# Patient Record
Sex: MALE | Race: WHITE | NOT HISPANIC OR LATINO | Employment: PART TIME | URBAN - METROPOLITAN AREA
[De-identification: names, ages, dates, MRNs, and addresses within clinical notes are randomized per-mention and may not be internally consistent; named-entity substitution may affect disease eponyms.]

---

## 2017-01-30 ENCOUNTER — TRANSCRIBE ORDERS (OUTPATIENT)
Dept: ADMINISTRATIVE | Age: 47
End: 2017-01-30

## 2017-01-30 ENCOUNTER — APPOINTMENT (OUTPATIENT)
Dept: LAB | Age: 47
End: 2017-01-30
Payer: MEDICARE

## 2017-01-30 DIAGNOSIS — N18.9 CHRONIC KIDNEY DISEASE, UNSPECIFIED: ICD-10-CM

## 2017-01-30 DIAGNOSIS — D64.9 ANEMIA, UNSPECIFIED: Primary | ICD-10-CM

## 2017-01-30 DIAGNOSIS — D64.9 ANEMIA, UNSPECIFIED: ICD-10-CM

## 2017-01-30 DIAGNOSIS — E56.9 UNSPECIFIED VITAMIN DEFICIENCY: ICD-10-CM

## 2017-01-30 DIAGNOSIS — E03.9 UNSPECIFIED HYPOTHYROIDISM: ICD-10-CM

## 2017-01-30 LAB
25(OH)D3 SERPL-MCNC: 36.4 NG/ML (ref 30–100)
ALBUMIN SERPL BCP-MCNC: 4 G/DL (ref 3.5–5)
ALP SERPL-CCNC: 108 U/L (ref 46–116)
ALT SERPL W P-5'-P-CCNC: 39 U/L (ref 12–78)
ANION GAP SERPL CALCULATED.3IONS-SCNC: 7 MMOL/L (ref 4–13)
AST SERPL W P-5'-P-CCNC: 18 U/L (ref 5–45)
BILIRUB SERPL-MCNC: 0.56 MG/DL (ref 0.2–1)
BUN SERPL-MCNC: 11 MG/DL (ref 5–25)
CALCIUM SERPL-MCNC: 9.2 MG/DL (ref 8.3–10.1)
CHLORIDE SERPL-SCNC: 102 MMOL/L (ref 100–108)
CHOLEST SERPL-MCNC: 196 MG/DL (ref 50–200)
CO2 SERPL-SCNC: 29 MMOL/L (ref 21–32)
CREAT SERPL-MCNC: 0.32 MG/DL (ref 0.6–1.3)
ERYTHROCYTE [DISTWIDTH] IN BLOOD BY AUTOMATED COUNT: 12.3 % (ref 11.6–15.1)
EST. AVERAGE GLUCOSE BLD GHB EST-MCNC: 103 MG/DL
GFR SERPL CREATININE-BSD FRML MDRD: >60 ML/MIN/1.73SQ M
GLUCOSE SERPL-MCNC: 93 MG/DL (ref 65–140)
HBA1C MFR BLD: 5.2 % (ref 4.2–6.3)
HCT VFR BLD AUTO: 45.5 % (ref 36.5–49.3)
HDLC SERPL-MCNC: 58 MG/DL (ref 40–60)
HGB BLD-MCNC: 15.2 G/DL (ref 12–17)
LDLC SERPL CALC-MCNC: 117 MG/DL (ref 0–100)
MCH RBC QN AUTO: 31.5 PG (ref 26.8–34.3)
MCHC RBC AUTO-ENTMCNC: 33.4 G/DL (ref 31.4–37.4)
MCV RBC AUTO: 94 FL (ref 82–98)
PLATELET # BLD AUTO: 233 THOUSANDS/UL (ref 149–390)
PMV BLD AUTO: 10.6 FL (ref 8.9–12.7)
POTASSIUM SERPL-SCNC: 4 MMOL/L (ref 3.5–5.3)
PROT SERPL-MCNC: 8.2 G/DL (ref 6.4–8.2)
RBC # BLD AUTO: 4.83 MILLION/UL (ref 3.88–5.62)
SODIUM SERPL-SCNC: 138 MMOL/L (ref 136–145)
T4 SERPL-MCNC: 11.7 UG/DL (ref 4.7–13.3)
TRIGL SERPL-MCNC: 107 MG/DL
TSH SERPL DL<=0.05 MIU/L-ACNC: 3.19 UIU/ML (ref 0.36–3.74)
WBC # BLD AUTO: 3.34 THOUSAND/UL (ref 4.31–10.16)

## 2017-01-30 PROCEDURE — 80053 COMPREHEN METABOLIC PANEL: CPT

## 2017-01-30 PROCEDURE — 85027 COMPLETE CBC AUTOMATED: CPT

## 2017-01-30 PROCEDURE — 82306 VITAMIN D 25 HYDROXY: CPT

## 2017-01-30 PROCEDURE — 36415 COLL VENOUS BLD VENIPUNCTURE: CPT

## 2017-01-30 PROCEDURE — 84436 ASSAY OF TOTAL THYROXINE: CPT

## 2017-01-30 PROCEDURE — 80061 LIPID PANEL: CPT

## 2017-01-30 PROCEDURE — 84443 ASSAY THYROID STIM HORMONE: CPT

## 2017-01-30 PROCEDURE — 83036 HEMOGLOBIN GLYCOSYLATED A1C: CPT

## 2017-11-08 ENCOUNTER — LAB (OUTPATIENT)
Dept: LAB | Age: 47
End: 2017-11-08
Payer: MEDICARE

## 2017-11-08 ENCOUNTER — TRANSCRIBE ORDERS (OUTPATIENT)
Dept: ADMINISTRATIVE | Age: 47
End: 2017-11-08

## 2017-11-08 DIAGNOSIS — I51.9 MYXEDEMA HEART DISEASE: ICD-10-CM

## 2017-11-08 DIAGNOSIS — D64.9 ANEMIA, UNSPECIFIED TYPE: Primary | ICD-10-CM

## 2017-11-08 DIAGNOSIS — R29.898 DEFICIENCIES OF LIMBS: ICD-10-CM

## 2017-11-08 DIAGNOSIS — M81.0 OSTEOPOROSIS, UNSPECIFIED OSTEOPOROSIS TYPE, UNSPECIFIED PATHOLOGICAL FRACTURE PRESENCE: ICD-10-CM

## 2017-11-08 DIAGNOSIS — IMO0002 VITAMIN DISEASE: ICD-10-CM

## 2017-11-08 DIAGNOSIS — I13.10 BENIGN HYPERTENSIVE HEART AND RENAL DISEASE: ICD-10-CM

## 2017-11-08 DIAGNOSIS — E03.9 MYXEDEMA HEART DISEASE: ICD-10-CM

## 2017-11-08 DIAGNOSIS — D64.9 ANEMIA, UNSPECIFIED TYPE: ICD-10-CM

## 2017-11-08 LAB
25(OH)D3 SERPL-MCNC: 36 NG/ML (ref 30–100)
ALBUMIN SERPL BCP-MCNC: 3.7 G/DL (ref 3.5–5)
ALP SERPL-CCNC: 116 U/L (ref 46–116)
ALT SERPL W P-5'-P-CCNC: 40 U/L (ref 12–78)
ANION GAP SERPL CALCULATED.3IONS-SCNC: 4 MMOL/L (ref 4–13)
AST SERPL W P-5'-P-CCNC: 21 U/L (ref 5–45)
BILIRUB SERPL-MCNC: 0.44 MG/DL (ref 0.2–1)
BUN SERPL-MCNC: 13 MG/DL (ref 5–25)
CALCIUM SERPL-MCNC: 9 MG/DL (ref 8.3–10.1)
CHLORIDE SERPL-SCNC: 104 MMOL/L (ref 100–108)
CHOLEST SERPL-MCNC: 181 MG/DL (ref 50–200)
CO2 SERPL-SCNC: 30 MMOL/L (ref 21–32)
CREAT SERPL-MCNC: 0.26 MG/DL (ref 0.6–1.3)
ERYTHROCYTE [DISTWIDTH] IN BLOOD BY AUTOMATED COUNT: 12.3 % (ref 11.6–15.1)
GFR SERPL CREATININE-BSD FRML MDRD: 168 ML/MIN/1.73SQ M
GLUCOSE P FAST SERPL-MCNC: 83 MG/DL (ref 65–99)
HCT VFR BLD AUTO: 43.1 % (ref 36.5–49.3)
HDLC SERPL-MCNC: 57 MG/DL (ref 40–60)
HGB BLD-MCNC: 14.6 G/DL (ref 12–17)
LDLC SERPL CALC-MCNC: 104 MG/DL (ref 0–100)
MCH RBC QN AUTO: 31.7 PG (ref 26.8–34.3)
MCHC RBC AUTO-ENTMCNC: 33.9 G/DL (ref 31.4–37.4)
MCV RBC AUTO: 94 FL (ref 82–98)
PLATELET # BLD AUTO: 277 THOUSANDS/UL (ref 149–390)
PMV BLD AUTO: 10.3 FL (ref 8.9–12.7)
POTASSIUM SERPL-SCNC: 4 MMOL/L (ref 3.5–5.3)
PROT SERPL-MCNC: 7.8 G/DL (ref 6.4–8.2)
RBC # BLD AUTO: 4.6 MILLION/UL (ref 3.88–5.62)
SODIUM SERPL-SCNC: 138 MMOL/L (ref 136–145)
T4 FREE SERPL-MCNC: 1.04 NG/DL (ref 0.76–1.46)
TRIGL SERPL-MCNC: 101 MG/DL
TSH SERPL DL<=0.05 MIU/L-ACNC: 2.04 UIU/ML (ref 0.36–3.74)
WBC # BLD AUTO: 3.72 THOUSAND/UL (ref 4.31–10.16)

## 2017-11-08 PROCEDURE — 80061 LIPID PANEL: CPT

## 2017-11-08 PROCEDURE — 84439 ASSAY OF FREE THYROXINE: CPT

## 2017-11-08 PROCEDURE — 82306 VITAMIN D 25 HYDROXY: CPT

## 2017-11-08 PROCEDURE — 85027 COMPLETE CBC AUTOMATED: CPT

## 2017-11-08 PROCEDURE — 84443 ASSAY THYROID STIM HORMONE: CPT

## 2017-11-08 PROCEDURE — 36415 COLL VENOUS BLD VENIPUNCTURE: CPT

## 2017-11-08 PROCEDURE — 80053 COMPREHEN METABOLIC PANEL: CPT

## 2018-06-22 ENCOUNTER — OFFICE VISIT (OUTPATIENT)
Dept: URGENT CARE | Age: 48
End: 2018-06-22
Payer: MEDICARE

## 2018-06-22 VITALS
RESPIRATION RATE: 18 BRPM | BODY MASS INDEX: 22.19 KG/M2 | HEART RATE: 60 BPM | HEIGHT: 70 IN | OXYGEN SATURATION: 96 % | WEIGHT: 155 LBS | SYSTOLIC BLOOD PRESSURE: 120 MMHG | DIASTOLIC BLOOD PRESSURE: 80 MMHG | TEMPERATURE: 98.9 F

## 2018-06-22 DIAGNOSIS — R21 RASH: Primary | ICD-10-CM

## 2018-06-22 PROCEDURE — G0463 HOSPITAL OUTPT CLINIC VISIT: HCPCS | Performed by: FAMILY MEDICINE

## 2018-06-22 PROCEDURE — 99213 OFFICE O/P EST LOW 20 MIN: CPT | Performed by: FAMILY MEDICINE

## 2018-06-22 RX ORDER — BORON CITRATE 3 MG
11 TABLET ORAL DAILY
COMMUNITY
End: 2021-06-25

## 2018-06-22 RX ORDER — ZINC GLUCONATE 50 MG
1 TABLET ORAL DAILY
COMMUNITY
End: 2021-06-17 | Stop reason: ALTCHOICE

## 2018-06-22 RX ORDER — VITAMIN E 268 MG
400 CAPSULE ORAL DAILY
COMMUNITY
End: 2021-06-17 | Stop reason: ALTCHOICE

## 2018-06-22 RX ORDER — MAG HYDROX/ALUMINUM HYD/SIMETH 400-400-40
1 SUSPENSION, ORAL (FINAL DOSE FORM) ORAL DAILY
COMMUNITY
End: 2021-06-17 | Stop reason: ALTCHOICE

## 2018-06-22 NOTE — PATIENT INSTRUCTIONS
Bacitracin ointment and bandage applied to area  I advised the patient to closely monitor for any signs of infection including increased redness, pain, fever or chills  Benadryl as needed for itching relief  Follow up with PCP in 3-5 days  Proceed to  ER if symptoms worsen

## 2018-06-22 NOTE — PROGRESS NOTES
[facility]        NAME: Kathreen Fothergill is a 49 y o  male  : 1970    MRN: [id number]  DATE: 2018  TIME: 3:25 PM    Assessment and Plan   Rash [R21]  1  Rash           Patient Instructions   Bacitracin ointment and bandage applied to area  I advised the patient to closely monitor for any signs of infection including increased redness, pain, fever or chills  Benadryl as needed for itching relief  Follow up with PCP in 3-5 days  Proceed to  ER if symptoms worsen  Chief Complaint     Chief Complaint   Patient presents with    Abrasion     Has had linear scratches with tiny, fluid-filled pustules on L shin x 1 month  Has not healed - now sl  crusty with occ  itching  No trauma  History of Present Illness   The patient is a 50-year-old male who presents with a rash on his left lower extremity for approximately 1 week  He cannot recall what may have caused the rash  He states that he wears long pants at all times  He does not remember being bit by an insect  He does have a past medical history of muscular dystrophy and is wheelchair-bound  He does have normal sensation to his lower extremities  He denies a PMH of diabetes  He states that he does wear leg braces at times but is not in that area  The rash is itchy  He denies any fever or chills  The rash is not present on any other part of his body  HPI    Review of Systems   Review of Systems   Constitutional: Negative for chills and fever  Respiratory: Negative for cough and shortness of breath  Cardiovascular: Negative for chest pain, palpitations and leg swelling  Skin: Positive for rash  Neurological: Negative for headaches  All other systems reviewed and are negative          Current Medications       Current Outpatient Prescriptions:     Boron 3 MG CAPS, Take 11 capsules by mouth daily, Disp: , Rfl:     Calcium Carbonate-Vit D-Min (CALCIUM 1200 PO), Take 1 tablet by mouth daily, Disp: , Rfl:    Cholecalciferol (VITAMIN D3) 5000 units CAPS, Take 1 capsule by mouth daily, Disp: , Rfl:     Magnesium 400 MG CAPS, Take 1 capsule by mouth daily, Disp: , Rfl:     Nutritional Supplements (CREATINE) 750 MG CAPS, Take 2 capsules by mouth daily, Disp: , Rfl:     vitamin E, tocopherol, 400 units capsule, Take 400 Units by mouth daily, Disp: , Rfl:     Zinc 50 MG TABS, Take 1 tablet by mouth daily, Disp: , Rfl:     Current Allergies     Allergies as of 06/22/2018 - Reviewed 06/22/2018   Allergen Reaction Noted    Anesthesia s-i-40 [propofol] Other (See Comments) 06/22/2018            The following portions of the patient's history were reviewed and updated as appropriate: allergies, current medications, past family history, past medical history, past social history, past surgical history and problem list      Past Medical History:   Diagnosis Date    [facility] )     since 9th grade       History reviewed  No pertinent surgical history  No family history on file  Medications have been verified  Objective   /80 (BP Location: Left arm, Patient Position: Sitting, Cuff Size: Standard)   Pulse 60   Temp 98 9 °F (37 2 °C) (Oral)   Resp 18   Ht 5' 10" (1 778 m)   Wt 70 3 kg (155 lb)   SpO2 96%   BMI 22 24 kg/m²        Physical Exam     Physical Exam   Constitutional: He appears well-developed and well-nourished  No distress  HENT:   Head: Normocephalic and atraumatic  Pulmonary/Chest: No respiratory distress  Skin: Skin is warm and dry  Rash noted  Rash is vesicular and urticarial  He is not diaphoretic  There is erythema  Nursing note and vitals reviewed

## 2018-06-26 ENCOUNTER — APPOINTMENT (OUTPATIENT)
Dept: RADIOLOGY | Age: 48
End: 2018-06-26
Payer: MEDICARE

## 2018-06-26 ENCOUNTER — OFFICE VISIT (OUTPATIENT)
Dept: URGENT CARE | Age: 48
End: 2018-06-26
Payer: MEDICARE

## 2018-06-26 VITALS
RESPIRATION RATE: 18 BRPM | DIASTOLIC BLOOD PRESSURE: 70 MMHG | WEIGHT: 155 LBS | HEIGHT: 70 IN | TEMPERATURE: 103.1 F | BODY MASS INDEX: 22.19 KG/M2 | OXYGEN SATURATION: 96 % | SYSTOLIC BLOOD PRESSURE: 130 MMHG | HEART RATE: 106 BPM

## 2018-06-26 DIAGNOSIS — R50.9 FEVER, UNSPECIFIED FEVER CAUSE: Primary | ICD-10-CM

## 2018-06-26 DIAGNOSIS — R50.9 FEVER, UNSPECIFIED FEVER CAUSE: ICD-10-CM

## 2018-06-26 LAB
SL AMB  POCT GLUCOSE, UA: ABNORMAL
SL AMB LEUKOCYTE ESTERASE,UA: ABNORMAL
SL AMB POCT BILIRUBIN,UA: ABNORMAL
SL AMB POCT BLOOD,UA: ABNORMAL
SL AMB POCT CLARITY,UA: CLEAR
SL AMB POCT COLOR,UA: YELLOW
SL AMB POCT KETONES,UA: ABNORMAL
SL AMB POCT NITRITE,UA: ABNORMAL
SL AMB POCT PH,UA: 6
SL AMB POCT SPECIFIC GRAVITY,UA: 1.02
SL AMB POCT URINE PROTEIN: ABNORMAL
SL AMB POCT UROBILINOGEN: 0.2

## 2018-06-26 PROCEDURE — 81002 URINALYSIS NONAUTO W/O SCOPE: CPT | Performed by: FAMILY MEDICINE

## 2018-06-26 PROCEDURE — 87086 URINE CULTURE/COLONY COUNT: CPT | Performed by: PHYSICIAN ASSISTANT

## 2018-06-26 PROCEDURE — 71046 X-RAY EXAM CHEST 2 VIEWS: CPT

## 2018-06-26 PROCEDURE — G0463 HOSPITAL OUTPT CLINIC VISIT: HCPCS | Performed by: FAMILY MEDICINE

## 2018-06-26 PROCEDURE — 99214 OFFICE O/P EST MOD 30 MIN: CPT | Performed by: FAMILY MEDICINE

## 2018-06-26 RX ORDER — ACETAMINOPHEN 325 MG/1
650 TABLET ORAL ONCE
Status: COMPLETED | OUTPATIENT
Start: 2018-06-26 | End: 2018-06-26

## 2018-06-26 RX ORDER — SULFAMETHOXAZOLE AND TRIMETHOPRIM 800; 160 MG/1; MG/1
1 TABLET ORAL EVERY 12 HOURS SCHEDULED
Qty: 20 TABLET | Refills: 0 | Status: SHIPPED | OUTPATIENT
Start: 2018-06-26 | End: 2018-06-26 | Stop reason: SDUPTHER

## 2018-06-26 RX ORDER — SULFAMETHOXAZOLE AND TRIMETHOPRIM 800; 160 MG/1; MG/1
1 TABLET ORAL EVERY 12 HOURS SCHEDULED
Qty: 20 TABLET | Refills: 0 | Status: SHIPPED | OUTPATIENT
Start: 2018-06-26 | End: 2018-07-06

## 2018-06-26 RX ADMIN — ACETAMINOPHEN 650 MG: 325 TABLET ORAL at 20:25

## 2018-06-26 NOTE — LETTER
June 26, 2018     Patient: Dianne Dominguez   YOB: 1970   Date of Visit: 6/26/2018       To Whom It May Concern: It is my medical opinion that Dianne Dominguez may return to work on 06/29/2018       If you have any questions or concerns, please don't hesitate to call           Sincerely,  Liat Ivan PA-C

## 2018-06-27 NOTE — PATIENT INSTRUCTIONS
Take antibiotic as directed with food and water  While on this medication begin a probiotic such as yogurt  Continue ibuprofen or Tylenol for fever and discomfort  You may continue a decongestant  Follow-up with your family doctor in the next 1-3 days  Call [facility] to establish care with a family physician if needed  Proceed to the ER if symptoms worsen  Proceed to the ER with worsening infection symptoms

## 2018-06-27 NOTE — PROGRESS NOTES
[facility]        NAME: Ria Bowling is a 49 y o  male  : 1970    MRN: [id number]  DATE: 2018  TIME: 8:27 PM    Assessment and Plan   Fever, unspecified fever cause [R50 9]  1  Fever, unspecified fever cause  XR chest pa & lateral    acetaminophen (TYLENOL) tablet 650 mg     Patient Instructions   Take antibiotic as directed with food and water  While on this medication begin a probiotic such as yogurt  Continue ibuprofen or Tylenol for fever and discomfort  You may continue a decongestant  Follow-up with your family doctor in the next 1-3 days  Call [facility] to establish care with a family physician if needed  Proceed to the ER if symptoms worsen  Proceed to the ER with worsening infection symptoms  Chief Complaint     Chief Complaint   Patient presents with    Fever     Fever today at 7:30 pm - 102 8  Started with chills at 5 pm   Started with sl  nasal congestion today - taking Advil Cold Sinus - last dose at 4 pm  Denies cough, sore throat, dysuria  History of Present Illness     58-year-old male with history of Suazo Muscular Dystrophy presenting with complaint of fever x1 day  T-max prior to presentation was 102 8  Associated sx include clear, watery nasal drainage, dry throat, and generalized myalgias  Sx significantly improved with use of Advil Cold and Sinus q4 hrs with last dose 4 hours ago  He is wheel chair bound but denies recent choking events, aspiration, or near aspiration  He denies fever, chills, coughing fits or other sick sx in the past 2-3 weeks  Pt works as a ticket  at [employer] and reportedly hit knee against the metal counter over a week ago  He was seen in this office and instructed to use an antibacterial cream for treatment  He has not noticed signs of worsening infection and notes the abrasion appears to be healing well  Denies associated ear pain, congestion, cough, wheezing, SOB, CP, palpitations, N/V/D   No known insect bites/animal bites or other injuries  No recent travel  No known sick contacts  He does not see a family doctor regularly but does follow with a specialist in [place] whom he see monthly  Denies recent antibiotic use, requirement for steroid use, or other treatments for chronic conditions  He denies any other PMH  Accompanied by his parents  Review of Systems   Review of Systems   Constitutional: Positive for chills and fever  Negative for diaphoresis and fatigue  HENT: Positive for rhinorrhea  Negative for congestion, ear pain, sinus pressure, sneezing and sore throat  Respiratory: Negative for cough, shortness of breath and wheezing  Cardiovascular: Negative for chest pain and palpitations  Gastrointestinal: Negative for abdominal pain, constipation, diarrhea, nausea and vomiting  Genitourinary: Negative for decreased urine volume, difficulty urinating, dysuria, frequency and urgency  Musculoskeletal: Positive for arthralgias and myalgias  Skin: Positive for wound  Negative for rash  Neurological: Negative for dizziness, seizures, weakness, light-headedness, numbness and headaches       Current Medications       Current Outpatient Prescriptions:     Boron 3 MG CAPS, Take 11 capsules by mouth daily, Disp: , Rfl:     Calcium Carbonate-Vit D-Min (CALCIUM 1200 PO), Take 1 tablet by mouth daily, Disp: , Rfl:     Cholecalciferol (VITAMIN D3) 5000 units CAPS, Take 1 capsule by mouth daily, Disp: , Rfl:     Magnesium 400 MG CAPS, Take 1 capsule by mouth daily, Disp: , Rfl:     Nutritional Supplements (CREATINE) 750 MG CAPS, Take 2 capsules by mouth daily, Disp: , Rfl:     vitamin E, tocopherol, 400 units capsule, Take 400 Units by mouth daily, Disp: , Rfl:     Zinc 50 MG TABS, Take 1 tablet by mouth daily, Disp: , Rfl:     Current Facility-Administered Medications:     acetaminophen (TYLENOL) tablet 650 mg, 650 mg, Oral, Once, Susi Menjivar PA-C    Current Allergies     Allergies as of 06/26/2018 - Reviewed 06/26/2018   Allergen Reaction Noted    Anesthesia s-i-40 [propofol] Other (See Comments) 06/22/2018            The following portions of the patient's history were reviewed and updated as appropriate: allergies, current medications, past family history, past medical history, past social history, past surgical history and problem list      Past Medical History:   Diagnosis Date    Suazo's muscular dystrophy ([address] )     since 9th grade       History reviewed  No pertinent surgical history  No family history on file  Medications have been verified  Objective   /70 (BP Location: Left arm, Patient Position: Sitting, Cuff Size: Standard)   Pulse (!) 106   Temp (!) 103 1 °F (39 5 °C) (Oral)   Resp 18   Ht 5' 10" (1 778 m)   Wt 70 3 kg (155 lb)   SpO2 96%   BMI 22 24 kg/m²      CXR: no acute abn  Urine Dip:   Component 6/26/18 2100   LEUKOCYTE ESTERASE,UA neg     NITRITE,UA neg    SL AMB POCT UROBILINOGEN 0 2    SL AMB POCT URINE PROTEIN neg     PH,UA 6 0     BLOOD,UA large     SPECIFIC GRAVITY,UA 1 025     KETONES,UA neg     BILIRUBIN,UA neg    GLUCOSE, UA neg     COLOR,UA yellow     CLARITY,UA clear        Physical Exam     Physical Exam   Constitutional: He is oriented to person, place, and time  He appears well-developed and well-nourished  No distress  HENT:   Head: Normocephalic and atraumatic  Right Ear: Tympanic membrane, external ear and ear canal normal    Left Ear: Tympanic membrane, external ear and ear canal normal    Mouth/Throat: Uvula is midline, oropharynx is clear and moist and mucous membranes are normal  Mucous membranes are not pale and not dry  No oropharyngeal exudate, posterior oropharyngeal edema or posterior oropharyngeal erythema  Injected nasal mucosa with clear nasal drainage present  No mucosal edema  Eyes: Conjunctivae are normal  Right eye exhibits no discharge  Left eye exhibits no discharge  No scleral icterus  Neck: Neck supple   No spinous process tenderness and no muscular tenderness present  No neck rigidity  Cardiovascular: Regular rhythm and normal heart sounds  Tachycardia present  Exam reveals no gallop, no distant heart sounds and no friction rub  No murmur heard  Pulmonary/Chest: Effort normal  No respiratory distress  He has decreased breath sounds in the right upper field and the right middle field  He has no wheezes  He has no rhonchi  He has no rales  He exhibits no tenderness  Abdominal: Soft  Bowel sounds are normal  He exhibits no distension  There is no hepatosplenomegaly  There is no tenderness  There is no rebound and no CVA tenderness  Lymphadenopathy:     He has no cervical adenopathy  Neurological: He is alert and oriented to person, place, and time  No cranial nerve deficit or sensory deficit  Coordination normal  GCS eye subscore is 4  GCS verbal subscore is 5  GCS motor subscore is 6  Skin: Skin is warm and dry  Abrasion (3 linear markings in a verticle pattern of the anterior left proximal shin/distal knee appearing well healed with new skin formation visible  No crusting, draiange, streaking redness, surrounding edema/erythema, or other signs of secondary infection  NTTP ) noted  No rash noted  Psychiatric: He has a normal mood and affect  His speech is normal and behavior is normal  Thought content normal    Nursing note and vitals reviewed

## 2018-06-28 LAB — BACTERIA UR CULT: NORMAL

## 2018-07-03 ENCOUNTER — OFFICE VISIT (OUTPATIENT)
Dept: FAMILY MEDICINE CLINIC | Facility: CLINIC | Age: 48
End: 2018-07-03
Payer: MEDICARE

## 2018-07-03 VITALS
DIASTOLIC BLOOD PRESSURE: 88 MMHG | SYSTOLIC BLOOD PRESSURE: 122 MMHG | OXYGEN SATURATION: 98 % | RESPIRATION RATE: 16 BRPM | HEART RATE: 97 BPM | TEMPERATURE: 97.8 F

## 2018-07-03 DIAGNOSIS — J20.8 ACUTE BRONCHITIS DUE TO OTHER SPECIFIED ORGANISMS: Primary | ICD-10-CM

## 2018-07-03 PROBLEM — G71.01 BECKER'S MUSCULAR DYSTROPHY (HCC): Chronic | Status: ACTIVE | Noted: 2018-07-03

## 2018-07-03 PROCEDURE — 99214 OFFICE O/P EST MOD 30 MIN: CPT | Performed by: INTERNAL MEDICINE

## 2018-07-03 RX ORDER — BENZONATATE 100 MG/1
100 CAPSULE ORAL 3 TIMES DAILY PRN
Qty: 20 CAPSULE | Refills: 2 | Status: SHIPPED | OUTPATIENT
Start: 2018-07-03 | End: 2018-07-13 | Stop reason: SDUPTHER

## 2018-07-03 RX ORDER — FLUTICASONE PROPIONATE 50 MCG
1 SPRAY, SUSPENSION (ML) NASAL DAILY
Qty: 16 G | Refills: 0 | Status: SHIPPED | OUTPATIENT
Start: 2018-07-03 | End: 2021-06-17 | Stop reason: ALTCHOICE

## 2018-07-03 NOTE — PROGRESS NOTES
Assessment/Plan:Mehdi has some acute bronchitis  Recent chest x-ray was negative urinalysis and urine culture are unremarkable  His cough is nonproductive occurring and spasms and keeping him awake at night  He has had a good response Robitussin the past which she can take as needed in addition will take Tessalon Perles every 8 hours 100-200 mg he is on trimethoprim sulfa the past 4 or 5 days and will finish the prescription he no longer has fever and pneumonia is considered unlikely I just a prolonged cough from his acute bronchitis    Diet reviewed  Lifestyle modifications reviewed  Medications reviewed and ordered  Laboratory tests and studies reviewed and ordered  All patient's questions answered to patient satisfaction  Problem List Items Addressed This Visit     None      Visit Diagnoses     Acute bronchitis due to other specified organisms    -  Primary    Relevant Medications    benzonatate (TESSALON PERLES) 100 mg capsule    fluticasone (FLONASE) 50 mcg/act nasal spray            Subjective:      Patient ID: Keon Barriga is a 49 y o  male  This 50-year-old male with a history of Suazo's muscular dystrophy noted 1 week ago temperatures of 103 to 103-1/2 followed by onset of nasal congestion and a dry nonproductive cough the fever has subsided and he was able to stop taking the ibuprofen 24 hr ago  He was seen several days ago and an urgent care I had a negative chest x-ray and urinalysis and was placed on trimethoprim sulfa DS twice daily  Unfortunately the cough persists and is waking up at night it is nonproductive  The following portions of the patient's history were reviewed and updated as appropriate: allergies, current medications, past family history, past medical history, past social history, past surgical history and problem list     Review of Systems   Constitutional: Negative for appetite change, fatigue, fever and unexpected weight change     HENT: Negative for rhinorrhea, sinus pain, sinus pressure, sneezing and sore throat  Eyes: Negative for visual disturbance  Respiratory: Positive for cough  Negative for chest tightness, shortness of breath and wheezing  Cardiovascular: Negative for chest pain, palpitations and leg swelling  Gastrointestinal: Negative for abdominal distention, abdominal pain, blood in stool, constipation, diarrhea, nausea and vomiting  Endocrine: Negative for polydipsia and polyuria  Genitourinary: Negative for decreased urine volume, difficulty urinating, dysuria, hematuria and urgency  Musculoskeletal: Negative for arthralgias, back pain, joint swelling and neck pain  Skin: Negative for rash  Allergic/Immunologic: Negative for environmental allergies  Neurological: Positive for weakness  Negative for tremors, light-headedness, numbness and headaches  Hematological: Does not bruise/bleed easily  Psychiatric/Behavioral: Negative for agitation, behavioral problems, confusion and dysphoric mood  The patient is not nervous/anxious  Objective:      /88 (BP Location: Right arm, Patient Position: Sitting, Cuff Size: Adult)   Pulse 97   Temp 97 8 °F (36 6 °C) (Temporal)   Resp 16   SpO2 98%          Physical Exam   Constitutional: He is oriented to person, place, and time  He appears well-developed and well-nourished  No distress  HENT:   Head: Normocephalic and atraumatic  Nose: Nose normal    Mouth/Throat: Oropharynx is clear and moist  No oropharyngeal exudate  Eyes: Conjunctivae and EOM are normal  Pupils are equal, round, and reactive to light  No scleral icterus  Neck: Normal range of motion  Neck supple  No JVD present  No tracheal deviation present  No thyromegaly present  Cardiovascular: Normal rate, regular rhythm and normal heart sounds  Exam reveals no gallop and no friction rub  No murmur heard  Pulmonary/Chest: Effort normal  No respiratory distress  He has no wheezes  He has no rales  He exhibits no tenderness  Abdominal: Soft  Bowel sounds are normal  He exhibits no distension and no mass  There is no tenderness  There is no rebound and no guarding  Musculoskeletal: Normal range of motion  He exhibits no edema or deformity  Lymphadenopathy:     He has no cervical adenopathy  Neurological: He is alert and oriented to person, place, and time  No cranial nerve deficit  Coordination normal    Skin: Skin is warm and dry  No rash noted  Psychiatric: He has a normal mood and affect   His behavior is normal  Judgment and thought content normal

## 2018-07-10 ENCOUNTER — TELEPHONE (OUTPATIENT)
Dept: FAMILY MEDICINE CLINIC | Facility: CLINIC | Age: 48
End: 2018-07-10

## 2018-07-13 ENCOUNTER — OFFICE VISIT (OUTPATIENT)
Dept: FAMILY MEDICINE CLINIC | Facility: CLINIC | Age: 48
End: 2018-07-13
Payer: MEDICARE

## 2018-07-13 DIAGNOSIS — G71.01 BECKER'S MUSCULAR DYSTROPHY (HCC): Primary | Chronic | ICD-10-CM

## 2018-07-13 DIAGNOSIS — J20.8 ACUTE BRONCHITIS DUE TO OTHER SPECIFIED ORGANISMS: ICD-10-CM

## 2018-07-13 PROCEDURE — 99214 OFFICE O/P EST MOD 30 MIN: CPT | Performed by: INTERNAL MEDICINE

## 2018-07-13 RX ORDER — BENZONATATE 100 MG/1
CAPSULE ORAL
Qty: 30 CAPSULE | Refills: 1 | Status: SHIPPED | OUTPATIENT
Start: 2018-07-13 | End: 2020-01-21

## 2018-07-13 NOTE — PROGRESS NOTES
Assessment/Plan:    Patient is recovering from acute site rhinitis and acute bronchitis  I will renew his [employer] and he will continue with that and histamine either generic Claritin or Allegra  He  Feels that he will benefit from an air filter in his bedroom and we discussed possibly be HIDA filters and the different air filters that are available pricing and so forth I think he should definitely   Do that  Diet reviewed  Lifestyle modifications reviewed  Medications reviewed and ordered  Laboratory tests and studies reviewed and ordered  All patient's questions answered to patient satisfaction  Diagnoses and all orders for this visit:    Suazo's muscular dystrophy ([address] )    Acute bronchitis due to other specified organisms  -     benzonatate (TESSALON PERLES) 100 mg capsule; 1 q 8 hrs prn cough        Subjective:      Patient ID: Sohail Hall is a 49 y o  male  HPI  This 41-year-old male is recovering from an upper respiratory infection with nasal congestion and acute bronchitis with a very bothersome hacking cough dry cough with no production cough has subsided some but is still bothersome to the patient  He is also bothered by some nasal congestion  He thinks that and histamine use an air filter help his nasal congestion and the Tessalon Perles seem to help suppress his cough  He is not short of breath or femoral   He was able to go typing on Wednesday to 2 days ago without any difficulty      Current Outpatient Prescriptions:     benzonatate (TESSALON PERLES) 100 mg capsule, 1 q 8 hrs prn cough, Disp: 30 capsule, Rfl: 1    [place] 3 MG CAPS, Take 11 capsules by mouth daily, Disp: , Rfl:     Calcium Carbonate-Vit D-Min (CALCIUM 1200 PO), Take 1 tablet by mouth daily, Disp: , Rfl:     Cholecalciferol (VITAMIN D3) 5000 units CAPS, Take 1 capsule by mouth daily, Disp: , Rfl:     fluticasone (FLONASE) 50 mcg/act nasal spray, 1 spray into each nostril daily, Disp: 16 g, Rfl: 0   Magnesium 400 MG CAPS, Take 1 capsule by mouth daily, Disp: , Rfl:     Nutritional Supplements (CREATINE) 750 MG CAPS, Take 2 capsules by mouth daily, Disp: , Rfl:     vitamin E, tocopherol, 400 units capsule, Take 400 Units by mouth daily, Disp: , Rfl:     Zinc 50 MG TABS, Take 1 tablet by mouth daily, Disp: , Rfl:     The following portions of the patient's history were reviewed and updated as appropriate: allergies, current medications, past family history, past medical history, past social history, past surgical history and problem list     Review of Systems   Constitutional: Negative for appetite change, fatigue, fever and unexpected weight change  HENT: Positive for rhinorrhea  Negative for sinus pain, sinus pressure, sneezing and sore throat  Eyes: Negative for visual disturbance  Respiratory: Positive for cough  Negative for chest tightness, shortness of breath and wheezing  Cardiovascular: Negative for chest pain, palpitations and leg swelling  Gastrointestinal: Negative for abdominal distention, abdominal pain, blood in stool, constipation, diarrhea, nausea and vomiting  Endocrine: Negative for polydipsia and polyuria  Genitourinary: Negative for decreased urine volume, difficulty urinating, dysuria, hematuria and urgency  Musculoskeletal: Negative for arthralgias, back pain, joint swelling and neck pain  Skin: Negative for rash  Allergic/Immunologic: Negative for environmental allergies  Neurological: Negative for tremors, weakness, light-headedness, numbness and headaches  Hematological: Does not bruise/bleed easily  Psychiatric/Behavioral: Negative for agitation, behavioral problems, confusion and dysphoric mood  The patient is not nervous/anxious            Family History   Problem Relation Age of Onset    Family history unknown: Yes       Past Medical History:   Diagnosis Date    [facility]     since 9th grade       No past surgical history on file     Social History     Social History    Marital status: Single     Spouse name: N/A    Number of children: N/A    Years of education: N/A     Social History Main Topics    Smoking status: Never Smoker    Smokeless tobacco: Never Used    Alcohol use 0 6 oz/week     1 Glasses of wine per week    Drug use: No    Sexual activity: Not on file     Other Topics Concern    Not on file     Social History Narrative    No narrative on file       Allergies   Allergen Reactions    Anesthesia S-I-40 [Propofol] Other (See Comments)     Malignant hypothermia         Objective: There were no vitals taken for this visit  Physical Exam   Constitutional: He is oriented to person, place, and time  He appears well-developed and well-nourished  No distress  HENT:   Head: Normocephalic and atraumatic  Nose: Mucosal edema and rhinorrhea present  Mouth/Throat: Oropharynx is clear and moist  No oropharyngeal exudate  Eyes: Conjunctivae and EOM are normal  Pupils are equal, round, and reactive to light  No scleral icterus  Neck: Normal range of motion  Neck supple  No JVD present  No tracheal deviation present  No thyromegaly present  Cardiovascular: Normal rate, regular rhythm and normal heart sounds  Exam reveals no gallop and no friction rub  No murmur heard  Pulmonary/Chest: Effort normal  No respiratory distress  He has no wheezes  He has no rales  He exhibits no tenderness  Abdominal: Soft  Bowel sounds are normal  He exhibits no distension and no mass  There is no tenderness  There is no rebound and no guarding  Musculoskeletal: Normal range of motion  He exhibits no edema or deformity  Lymphadenopathy:     He has no cervical adenopathy  Neurological: He is alert and oriented to person, place, and time  No cranial nerve deficit  Coordination normal    Skin: Skin is warm and dry  No rash noted  Psychiatric: He has a normal mood and affect   His behavior is normal  Judgment and thought content normal

## 2018-09-04 ENCOUNTER — APPOINTMENT (OUTPATIENT)
Dept: LAB | Age: 48
End: 2018-09-04
Payer: MEDICARE

## 2018-09-04 ENCOUNTER — TRANSCRIBE ORDERS (OUTPATIENT)
Dept: ADMINISTRATIVE | Age: 48
End: 2018-09-04

## 2018-09-04 DIAGNOSIS — E03.9 HYPOTHYROIDISM, UNSPECIFIED TYPE: ICD-10-CM

## 2018-09-04 DIAGNOSIS — E03.9 HYPOTHYROIDISM, UNSPECIFIED TYPE: Primary | ICD-10-CM

## 2018-09-04 LAB
T4 FREE SERPL-MCNC: 1.06 NG/DL (ref 0.76–1.46)
TSH SERPL DL<=0.05 MIU/L-ACNC: 3.25 UIU/ML (ref 0.36–3.74)

## 2018-09-04 PROCEDURE — 84439 ASSAY OF FREE THYROXINE: CPT

## 2018-09-04 PROCEDURE — 84443 ASSAY THYROID STIM HORMONE: CPT

## 2018-09-04 PROCEDURE — 36415 COLL VENOUS BLD VENIPUNCTURE: CPT

## 2019-08-07 ENCOUNTER — APPOINTMENT (OUTPATIENT)
Dept: LAB | Age: 49
End: 2019-08-07
Payer: MEDICARE

## 2019-08-07 ENCOUNTER — OFFICE VISIT (OUTPATIENT)
Dept: LAB | Age: 49
End: 2019-08-07
Payer: MEDICARE

## 2019-08-07 ENCOUNTER — TRANSCRIBE ORDERS (OUTPATIENT)
Dept: ADMINISTRATIVE | Age: 49
End: 2019-08-07

## 2019-08-07 DIAGNOSIS — I51.9 MYXEDEMA HEART DISEASE: ICD-10-CM

## 2019-08-07 DIAGNOSIS — R53.1 ASTHENIA: ICD-10-CM

## 2019-08-07 DIAGNOSIS — I42.9 FAMILIAL CARDIOMYOPATHY (HCC): ICD-10-CM

## 2019-08-07 DIAGNOSIS — E03.9 MYXEDEMA HEART DISEASE: ICD-10-CM

## 2019-08-07 DIAGNOSIS — D64.9 ANEMIA, UNSPECIFIED TYPE: ICD-10-CM

## 2019-08-07 DIAGNOSIS — N18.9 CHRONIC KIDNEY DISEASE, UNSPECIFIED CKD STAGE: ICD-10-CM

## 2019-08-07 DIAGNOSIS — D64.9 ANEMIA, UNSPECIFIED TYPE: Primary | ICD-10-CM

## 2019-08-07 LAB
25(OH)D3 SERPL-MCNC: 31.6 NG/ML (ref 30–100)
ALBUMIN SERPL BCP-MCNC: 4.4 G/DL (ref 3.5–5)
ALP SERPL-CCNC: 108 U/L (ref 46–116)
ALT SERPL W P-5'-P-CCNC: 45 U/L (ref 12–78)
ANION GAP SERPL CALCULATED.3IONS-SCNC: 7 MMOL/L (ref 4–13)
AST SERPL W P-5'-P-CCNC: 30 U/L (ref 5–45)
ATRIAL RATE: 54 BPM
BILIRUB SERPL-MCNC: 0.54 MG/DL (ref 0.2–1)
BUN SERPL-MCNC: 12 MG/DL (ref 5–25)
CALCIUM SERPL-MCNC: 9.4 MG/DL (ref 8.3–10.1)
CHLORIDE SERPL-SCNC: 109 MMOL/L (ref 100–108)
CK MB SERPL-MCNC: 11.3 NG/ML (ref 0–5)
CK MB SERPL-MCNC: 2 % (ref 0–2.5)
CK SERPL-CCNC: 564 U/L (ref 39–308)
CO2 SERPL-SCNC: 27 MMOL/L (ref 21–32)
CREAT SERPL-MCNC: 0.35 MG/DL (ref 0.6–1.3)
ERYTHROCYTE [DISTWIDTH] IN BLOOD BY AUTOMATED COUNT: 12.2 % (ref 11.6–15.1)
GFR SERPL CREATININE-BSD FRML MDRD: 147 ML/MIN/1.73SQ M
GLUCOSE P FAST SERPL-MCNC: 84 MG/DL (ref 65–99)
HCT VFR BLD AUTO: 44 % (ref 36.5–49.3)
HGB BLD-MCNC: 14.3 G/DL (ref 12–17)
MCH RBC QN AUTO: 30.8 PG (ref 26.8–34.3)
MCHC RBC AUTO-ENTMCNC: 32.5 G/DL (ref 31.4–37.4)
MCV RBC AUTO: 95 FL (ref 82–98)
P AXIS: 58 DEGREES
PLATELET # BLD AUTO: 228 THOUSANDS/UL (ref 149–390)
PMV BLD AUTO: 10.6 FL (ref 8.9–12.7)
POTASSIUM SERPL-SCNC: 3.9 MMOL/L (ref 3.5–5.3)
PR INTERVAL: 112 MS
PROT SERPL-MCNC: 7.6 G/DL (ref 6.4–8.2)
QRS AXIS: 17 DEGREES
QRSD INTERVAL: 86 MS
QT INTERVAL: 458 MS
QTC INTERVAL: 434 MS
RBC # BLD AUTO: 4.65 MILLION/UL (ref 3.88–5.62)
SODIUM SERPL-SCNC: 143 MMOL/L (ref 136–145)
T WAVE AXIS: 54 DEGREES
T4 FREE SERPL-MCNC: 1.09 NG/DL (ref 0.76–1.46)
TSH SERPL DL<=0.05 MIU/L-ACNC: 2.72 UIU/ML (ref 0.36–3.74)
VENTRICULAR RATE: 54 BPM
WBC # BLD AUTO: 3.26 THOUSAND/UL (ref 4.31–10.16)

## 2019-08-07 PROCEDURE — 84443 ASSAY THYROID STIM HORMONE: CPT

## 2019-08-07 PROCEDURE — 82550 ASSAY OF CK (CPK): CPT

## 2019-08-07 PROCEDURE — 36415 COLL VENOUS BLD VENIPUNCTURE: CPT

## 2019-08-07 PROCEDURE — 80053 COMPREHEN METABOLIC PANEL: CPT

## 2019-08-07 PROCEDURE — 82306 VITAMIN D 25 HYDROXY: CPT

## 2019-08-07 PROCEDURE — 93005 ELECTROCARDIOGRAM TRACING: CPT

## 2019-08-07 PROCEDURE — 85027 COMPLETE CBC AUTOMATED: CPT

## 2019-08-07 PROCEDURE — 93010 ELECTROCARDIOGRAM REPORT: CPT | Performed by: INTERNAL MEDICINE

## 2019-08-07 PROCEDURE — 84439 ASSAY OF FREE THYROXINE: CPT

## 2019-08-07 PROCEDURE — 82553 CREATINE MB FRACTION: CPT

## 2020-01-21 ENCOUNTER — OFFICE VISIT (OUTPATIENT)
Dept: FAMILY MEDICINE CLINIC | Facility: CLINIC | Age: 50
End: 2020-01-21
Payer: MEDICARE

## 2020-01-21 VITALS
DIASTOLIC BLOOD PRESSURE: 84 MMHG | HEART RATE: 77 BPM | RESPIRATION RATE: 16 BRPM | SYSTOLIC BLOOD PRESSURE: 130 MMHG | TEMPERATURE: 97.9 F | OXYGEN SATURATION: 98 %

## 2020-01-21 DIAGNOSIS — J31.0 RHINOSINUSITIS: ICD-10-CM

## 2020-01-21 DIAGNOSIS — J32.9 RHINOSINUSITIS: ICD-10-CM

## 2020-01-21 DIAGNOSIS — Z00.00 MEDICARE ANNUAL WELLNESS VISIT, SUBSEQUENT: Primary | ICD-10-CM

## 2020-01-21 DIAGNOSIS — G71.01 BECKER'S MUSCULAR DYSTROPHY (HCC): ICD-10-CM

## 2020-01-21 DIAGNOSIS — Z99.3 WHEELCHAIR BOUND: ICD-10-CM

## 2020-01-21 PROCEDURE — G0439 PPPS, SUBSEQ VISIT: HCPCS | Performed by: INTERNAL MEDICINE

## 2020-01-21 PROCEDURE — 99214 OFFICE O/P EST MOD 30 MIN: CPT | Performed by: INTERNAL MEDICINE

## 2020-01-21 NOTE — PROGRESS NOTES
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)      Other Visit Diagnoses     Medicare annual wellness visit, subsequent    -  Primary           Preventive health issues were discussed with patient, and age appropriate screening tests were ordered as noted in patient's After Visit Summary  Personalized health advice and appropriate referrals for health education or preventive services given if needed, as noted in patient's After Visit Summary  History of Present Illness:     Patient presents for Welcome to Medicare visit  Patient Care Team:  Tia Pham MD as PCP - General (Internal Medicine)     Review of Systems:     Review of Systems   Problem List:     Patient Active Problem List   Diagnosis    Suazo's muscular dystrophy [facility])      Past Medical and Surgical History:     Past Medical History:   Diagnosis Date    Suazo's muscular dystrophy ([facility] )     since 9th grade     No past surgical history on file  Family History:     Family History   Family history unknown: Yes      Social History:     Social History     Socioeconomic History    Marital status: Single     Spouse name: Not on file    Number of children: Not on file    Years of education: Not on file    Highest education level: Not on file   Occupational History    Not on file   Social Needs    Financial resource strain: Not on file    Food insecurity:     Worry: Not on file     Inability: Not on file    Transportation needs:     Medical: Not on file     Non-medical: Not on file   Tobacco Use    Smoking status: Never Smoker    Smokeless tobacco: Never Used   Substance and Sexual Activity    Alcohol use:  Yes     Alcohol/week: 1 0 standard drinks     Types: 1 Glasses of wine per week    Drug use: No    Sexual activity: Not on file   Lifestyle    Physical activity:     Days per week: Not on file     Minutes per session: Not on file    Stress: Not on file   Relationships    Social connections:     Talks on phone: Not on file     Gets together: Not on file     Attends Anabaptism service: Not on file     Active member of club or organization: Not on file     Attends meetings of clubs or organizations: Not on file     Relationship status: Not on file    Intimate partner violence:     Fear of current or ex partner: Not on file     Emotionally abused: Not on file     Physically abused: Not on file     Forced sexual activity: Not on file   Other Topics Concern    Not on file   Social History Narrative    Not on file      Medications and Allergies:     Current Outpatient Medications   Medication Sig Dispense Refill    Highland 3 MG CAPS Take 11 capsules by mouth daily      Calcium Carbonate-Vit D-Min (CALCIUM 1200 PO) Take 1 tablet by mouth daily      Cholecalciferol (VITAMIN D3) 5000 units CAPS Take 1 capsule by mouth daily      fluticasone (FLONASE) 50 mcg/act nasal spray 1 spray into each nostril daily 16 g 0    Magnesium 400 MG CAPS Take 1 capsule by mouth daily      Nutritional Supplements (CREATINE) 750 MG CAPS Take 2 capsules by mouth daily      vitamin E, tocopherol, 400 units capsule Take 400 Units by mouth daily      Zinc 50 MG TABS Take 1 tablet by mouth daily       No current facility-administered medications for this visit        Allergies   Allergen Reactions    Anesthesia S-I-40 [Propofol] Other (See Comments)     Malignant hypothermia      Immunizations:     Immunization History   Administered Date(s) Administered    Hep A / Hep B 12/19/2005, 01/20/2006, 07/24/2006    INFLUENZA 12/04/2013, 12/14/2014, 12/08/2015    Tdap 07/24/2006      Health Maintenance:         Topic Date Due    CRC Screening: Colonoscopy  1970         Topic Date Due    Pneumococcal Vaccine: Pediatrics (0 to 5 Years) and At-Risk Patients (6 to 59 Years) (1 of 1 - PPSV23) 08/13/1975    DTaP,Tdap,and Td Vaccines (2 - Td) 07/24/2016    Influenza Vaccine 07/01/2019      Medicare Screening Tests and Risk Assessments:     Winifred Watkins is here for his Subsequent Wellness visit  Last Medicare Wellness visit information reviewed, patient interviewed, no change since last AWV  Health Risk Assessment:   Patient rates overall health as good  Patient feels that their physical health rating is same  Eyesight was rated as same  Hearing was rated as same  Patient feels that their emotional and mental health rating is same  Pain experienced in the last 7 days has been none  Patient states that he has experienced no weight loss or gain in last 6 months  Depression Screening:   PHQ-2 Score: 0      Fall Risk Screening: In the past year, patient has experienced: no history of falling in past year      Home Safety:  Patient has trouble with stairs inside or outside of their home  Patient has working smoke alarms and has working carbon monoxide detector  Home safety hazards include: none  Nutrition:   Current diet is Regular  Medications:   Patient is currently taking over-the-counter supplements  OTC medications include: see medication list  Patient is able to manage medications  Activities of Daily Living (ADLs)/Instrumental Activities of Daily Living (IADLs):   Walk and transfer into and out of bed and chair?: Yes  Dress and groom yourself?: Yes    Bathe or shower yourself?: No    Feed yourself?  Yes  Do your laundry/housekeeping?: No  Manage your money, pay your bills and track your expenses?: Yes  Make your own meals?: No    Do your own shopping?: No    ADL comments: Pt has FT caregiver to help him    Previous Hospitalizations:   Any hospitalizations or ED visits within the last 12 months?: No      Advance Care Planning:   Living will: No    Durable POA for healthcare: No    Advanced directive: No    End of Life Decisions reviewed with patient: Yes      Cognitive Screening:   Provider or family/friend/caregiver concerned regarding cognition?: No    PREVENTIVE SCREENINGS      Cardiovascular Screening:    General: Screening Current and Risks and Benefits Discussed      Diabetes Screening:     General: Screening Current and Risks and Benefits Discussed      Colorectal Cancer Screening:     General: Risks and Benefits Discussed      Prostate Cancer Screening:    General: Screening Not Indicated and Risks and Benefits Discussed      Osteoporosis Screening:    General: Risks and Benefits Discussed      Abdominal Aortic Aneurysm (AAA) Screening:        General: Risks and Benefits Discussed      Lung Cancer Screening:     General: Risks and Benefits Discussed      Hepatitis C Screening:    General: Risks and Benefits Discussed    Hep C Screening Accepted: No     Other Counseling Topics:   Car/seat belt/driving safety and calcium and vitamin D intake  No exam data present     Physical Exam:     There were no vitals taken for this visit      Physical Exam    Xochitl Conteh MD

## 2020-01-21 NOTE — PROGRESS NOTES
Assessment/Plan:  Patient Instructions   Continue Advil cold sinus  Add Flonase 1 spray each nostril twice daily       Diet reviewed  Lifestyle modifications reviewed  Medications reviewed and ordered  Laboratory tests and studies reviewed and ordered  All patient's questions answered to patient satisfaction  Chief Complaint   Patient presents with   [facility] Wellness Visit     Pt here for annual wellness    URI     Pt c/o URI symptoms for about 5 days          Diagnoses and all orders for this visit:    Medicare annual wellness visit, subsequent    Rhinosinusitis    Suazo's muscular dystrophy ([facility] )    Wheelchair bound        Subjective: This 70-year-old male has a five-day history of nasal congestion with rhinorrhea, the patient is able to swallow without difficulty has no sore throat, he has had no fever, no chills, no cough  No one else in his family is ill  Patient ID: Harman Jones is a 47 y o  male          Current Outpatient Medications:     Boron 3 MG CAPS, Take 11 capsules by mouth daily, Disp: , Rfl:     Calcium Carbonate-Vit D-Min (CALCIUM 1200 PO), Take 1 tablet by mouth daily, Disp: , Rfl:     Cholecalciferol (VITAMIN D3) 5000 units CAPS, Take 1 capsule by mouth daily, Disp: , Rfl:     fluticasone (FLONASE) 50 mcg/act nasal spray, 1 spray into each nostril daily, Disp: 16 g, Rfl: 0    Magnesium 400 MG CAPS, Take 1 capsule by mouth daily, Disp: , Rfl:     Nutritional Supplements (CREATINE) 750 MG CAPS, Take 2 capsules by mouth daily, Disp: , Rfl:     vitamin E, tocopherol, 400 units capsule, Take 400 Units by mouth daily, Disp: , Rfl:     Zinc 50 MG TABS, Take 1 tablet by mouth daily, Disp: , Rfl:     The following portions of the patient's history were reviewed and updated as appropriate: allergies, current medications, past family history, past medical history, past social history, past surgical history and problem list     Review of Systems   Constitutional: Negative for appetite change, fatigue, fever and unexpected weight change  HENT: Positive for congestion, rhinorrhea and sneezing  Negative for sinus pressure and sore throat  Eyes: Negative for visual disturbance  Respiratory: Negative for cough, chest tightness, shortness of breath and wheezing  Cardiovascular: Negative for chest pain, palpitations and leg swelling  Gastrointestinal: Negative for abdominal distention, abdominal pain, blood in stool, constipation, diarrhea, nausea and vomiting  Endocrine: Negative for polydipsia and polyuria  Genitourinary: Negative for decreased urine volume, difficulty urinating, dysuria, hematuria and urgency  Musculoskeletal: Negative for arthralgias, back pain, joint swelling and neck pain  Skin: Negative for rash  Allergic/Immunologic: Negative for environmental allergies  Neurological: Negative for tremors, weakness, light-headedness, numbness and headaches  Hematological: Does not bruise/bleed easily  Psychiatric/Behavioral: Negative for agitation, behavioral problems, confusion and dysphoric mood  The patient is not nervous/anxious  Family History   Family history unknown: Yes       Past Medical History:   Diagnosis Date    Suazo's muscular dystrophy ([facility] )     since 9th grade       History reviewed  No pertinent surgical history  Social History     Socioeconomic History    Marital status: Single     Spouse name: None    Number of children: None    Years of education: None    Highest education level: None   Occupational History    None   Social Needs    Financial resource strain: None    Food insecurity:     Worry: None     Inability: None    Transportation needs:     Medical: None     Non-medical: None   Tobacco Use    Smoking status: Never Smoker    Smokeless tobacco: Never Used   Substance and Sexual Activity    Alcohol use:  Yes     Alcohol/week: 1 0 standard drinks     Types: 1 Glasses of wine per week    Drug use: No    Sexual activity: Not Currently     Partners: Female   Lifestyle    Physical activity:     Days per week: None     Minutes per session: None    Stress: None   Relationships    Social connections:     Talks on phone: None     Gets together: None     Attends Tenriism service: None     Active member of club or organization: None     Attends meetings of clubs or organizations: None     Relationship status: None    Intimate partner violence:     Fear of current or ex partner: None     Emotionally abused: None     Physically abused: None     Forced sexual activity: None   Other Topics Concern    None   Social History Narrative    None       Allergies   Allergen Reactions    Anesthesia S-I-40 [Propofol] Other (See Comments)     Malignant hypothermia       BMI Counseling: There is no height or weight on file to calculate BMI  Follow-up plan was not completed due to patient receiving palliative care  Pt is wheelchair bound          Objective:    /84   Pulse 77   Temp 97 9 °F (36 6 °C)   Resp 16   SpO2 98%        Physical Exam   Constitutional: He is oriented to person, place, and time  He appears well-developed and well-nourished  No distress  HENT:   Head: Normocephalic and atraumatic  Mouth/Throat: No oropharyngeal exudate  Nasal congestion   Eyes: Pupils are equal, round, and reactive to light  Conjunctivae and EOM are normal  No scleral icterus  Neck: Normal range of motion  Neck supple  No JVD present  No tracheal deviation present  No thyromegaly present  Cardiovascular: Normal rate, regular rhythm and normal heart sounds  Exam reveals no gallop and no friction rub  No murmur heard  Pulmonary/Chest: Effort normal  No respiratory distress  He has no wheezes  He has no rales  He exhibits no tenderness  Abdominal: Soft  Bowel sounds are normal  He exhibits no distension and no mass  There is no tenderness  There is no rebound and no guarding  Musculoskeletal: Normal range of motion   He exhibits no edema or deformity  Lymphadenopathy:     He has no cervical adenopathy  Neurological: He is alert and oriented to person, place, and time  No cranial nerve deficit  Coordination normal    Skin: Skin is warm and dry  No rash noted  Psychiatric: He has a normal mood and affect   His behavior is normal  Judgment and thought content normal

## 2020-04-01 DIAGNOSIS — J32.9 RHINOSINUSITIS: Primary | ICD-10-CM

## 2020-04-01 DIAGNOSIS — J31.0 RHINOSINUSITIS: Primary | ICD-10-CM

## 2020-04-01 RX ORDER — BENZONATATE 100 MG/1
100 CAPSULE ORAL 3 TIMES DAILY PRN
Qty: 20 CAPSULE | Refills: 0 | Status: SHIPPED | OUTPATIENT
Start: 2020-04-01 | End: 2021-06-17 | Stop reason: ALTCHOICE

## 2020-06-16 ENCOUNTER — APPOINTMENT (OUTPATIENT)
Dept: LAB | Age: 50
End: 2020-06-16
Payer: MEDICARE

## 2020-06-16 ENCOUNTER — OFFICE VISIT (OUTPATIENT)
Dept: FAMILY MEDICINE CLINIC | Facility: CLINIC | Age: 50
End: 2020-06-16
Payer: MEDICARE

## 2020-06-16 ENCOUNTER — LAB (OUTPATIENT)
Dept: LAB | Age: 50
End: 2020-06-16
Payer: MEDICARE

## 2020-06-16 ENCOUNTER — TRANSCRIBE ORDERS (OUTPATIENT)
Dept: ADMINISTRATIVE | Age: 50
End: 2020-06-16

## 2020-06-16 VITALS
DIASTOLIC BLOOD PRESSURE: 70 MMHG | OXYGEN SATURATION: 98 % | BODY MASS INDEX: 22.96 KG/M2 | WEIGHT: 160 LBS | TEMPERATURE: 98.6 F | SYSTOLIC BLOOD PRESSURE: 120 MMHG | HEART RATE: 83 BPM

## 2020-06-16 DIAGNOSIS — I51.9 MYXEDEMA HEART DISEASE: ICD-10-CM

## 2020-06-16 DIAGNOSIS — E55.9 AVITAMINOSIS D: ICD-10-CM

## 2020-06-16 DIAGNOSIS — E03.9 MYXEDEMA HEART DISEASE: ICD-10-CM

## 2020-06-16 DIAGNOSIS — I42.9 FAMILIAL CARDIOMYOPATHY (HCC): ICD-10-CM

## 2020-06-16 DIAGNOSIS — D64.9 ANEMIA, UNSPECIFIED TYPE: Primary | ICD-10-CM

## 2020-06-16 DIAGNOSIS — N18.9 CHRONIC KIDNEY DISEASE, UNSPECIFIED CKD STAGE: ICD-10-CM

## 2020-06-16 DIAGNOSIS — J30.1 ALLERGIC RHINITIS DUE TO POLLEN, UNSPECIFIED SEASONALITY: ICD-10-CM

## 2020-06-16 DIAGNOSIS — M81.0 SENILE OSTEOPOROSIS: ICD-10-CM

## 2020-06-16 DIAGNOSIS — D64.9 ANEMIA, UNSPECIFIED TYPE: ICD-10-CM

## 2020-06-16 DIAGNOSIS — G71.01 BECKER'S MUSCULAR DYSTROPHY (HCC): Chronic | ICD-10-CM

## 2020-06-16 DIAGNOSIS — Z12.11 COLON CANCER SCREENING: Primary | ICD-10-CM

## 2020-06-16 PROBLEM — J30.9 ALLERGIC RHINITIS: Status: ACTIVE | Noted: 2020-06-16

## 2020-06-16 LAB
25(OH)D3 SERPL-MCNC: 29.8 NG/ML (ref 30–100)
ALBUMIN SERPL BCP-MCNC: 3.8 G/DL (ref 3.5–5)
ALP SERPL-CCNC: 118 U/L (ref 46–116)
ALT SERPL W P-5'-P-CCNC: 37 U/L (ref 12–78)
ANION GAP SERPL CALCULATED.3IONS-SCNC: 5 MMOL/L (ref 4–13)
AST SERPL W P-5'-P-CCNC: 21 U/L (ref 5–45)
ATRIAL RATE: 73 BPM
BILIRUB SERPL-MCNC: 0.55 MG/DL (ref 0.2–1)
BUN SERPL-MCNC: 12 MG/DL (ref 5–25)
CALCIUM SERPL-MCNC: 9 MG/DL (ref 8.3–10.1)
CHLORIDE SERPL-SCNC: 105 MMOL/L (ref 100–108)
CK MB SERPL-MCNC: 2.4 % (ref 0–2.5)
CK MB SERPL-MCNC: 9.3 NG/ML (ref 0–5)
CK SERPL-CCNC: 391 U/L (ref 39–308)
CO2 SERPL-SCNC: 28 MMOL/L (ref 21–32)
CREAT SERPL-MCNC: 0.29 MG/DL (ref 0.6–1.3)
ERYTHROCYTE [DISTWIDTH] IN BLOOD BY AUTOMATED COUNT: 11.9 % (ref 11.6–15.1)
GFR SERPL CREATININE-BSD FRML MDRD: 158 ML/MIN/1.73SQ M
GLUCOSE P FAST SERPL-MCNC: 83 MG/DL (ref 65–99)
HCT VFR BLD AUTO: 45.1 % (ref 36.5–49.3)
HGB BLD-MCNC: 14.5 G/DL (ref 12–17)
MCH RBC QN AUTO: 30.8 PG (ref 26.8–34.3)
MCHC RBC AUTO-ENTMCNC: 32.2 G/DL (ref 31.4–37.4)
MCV RBC AUTO: 96 FL (ref 82–98)
P AXIS: 71 DEGREES
PLATELET # BLD AUTO: 238 THOUSANDS/UL (ref 149–390)
PMV BLD AUTO: 10.3 FL (ref 8.9–12.7)
POTASSIUM SERPL-SCNC: 4.3 MMOL/L (ref 3.5–5.3)
PR INTERVAL: 122 MS
PROT SERPL-MCNC: 7.8 G/DL (ref 6.4–8.2)
QRS AXIS: 42 DEGREES
QRSD INTERVAL: 84 MS
QT INTERVAL: 408 MS
QTC INTERVAL: 449 MS
RBC # BLD AUTO: 4.71 MILLION/UL (ref 3.88–5.62)
SODIUM SERPL-SCNC: 138 MMOL/L (ref 136–145)
T WAVE AXIS: 60 DEGREES
T4 FREE SERPL-MCNC: 1.05 NG/DL (ref 0.76–1.46)
TSH SERPL DL<=0.05 MIU/L-ACNC: 2.73 UIU/ML (ref 0.36–3.74)
VENTRICULAR RATE: 73 BPM
WBC # BLD AUTO: 3.81 THOUSAND/UL (ref 4.31–10.16)

## 2020-06-16 PROCEDURE — 93010 ELECTROCARDIOGRAM REPORT: CPT | Performed by: INTERNAL MEDICINE

## 2020-06-16 PROCEDURE — 99213 OFFICE O/P EST LOW 20 MIN: CPT | Performed by: FAMILY MEDICINE

## 2020-06-16 PROCEDURE — 80053 COMPREHEN METABOLIC PANEL: CPT

## 2020-06-16 PROCEDURE — 85027 COMPLETE CBC AUTOMATED: CPT

## 2020-06-16 PROCEDURE — 84443 ASSAY THYROID STIM HORMONE: CPT

## 2020-06-16 PROCEDURE — 82553 CREATINE MB FRACTION: CPT

## 2020-06-16 PROCEDURE — 1036F TOBACCO NON-USER: CPT | Performed by: FAMILY MEDICINE

## 2020-06-16 PROCEDURE — 82550 ASSAY OF CK (CPK): CPT

## 2020-06-16 PROCEDURE — 93005 ELECTROCARDIOGRAM TRACING: CPT

## 2020-06-16 PROCEDURE — 82306 VITAMIN D 25 HYDROXY: CPT

## 2020-06-16 PROCEDURE — 84439 ASSAY OF FREE THYROXINE: CPT

## 2020-06-16 PROCEDURE — 36415 COLL VENOUS BLD VENIPUNCTURE: CPT

## 2020-06-16 RX ORDER — KETOTIFEN FUMARATE 0.35 MG/ML
1 SOLUTION/ DROPS OPHTHALMIC 2 TIMES DAILY
Qty: 5 ML | Refills: 0 | Status: SHIPPED | OUTPATIENT
Start: 2020-06-16 | End: 2021-06-17 | Stop reason: ALTCHOICE

## 2020-06-16 RX ORDER — FLUTICASONE PROPIONATE 50 MCG
2 SPRAY, SUSPENSION (ML) NASAL DAILY
Qty: 1 BOTTLE | Refills: 0 | Status: SHIPPED | OUTPATIENT
Start: 2020-06-16 | End: 2021-06-17 | Stop reason: ALTCHOICE

## 2021-02-24 ENCOUNTER — LAB (OUTPATIENT)
Dept: LAB | Age: 51
End: 2021-02-24
Payer: MEDICARE

## 2021-02-24 ENCOUNTER — TRANSCRIBE ORDERS (OUTPATIENT)
Dept: ADMINISTRATIVE | Age: 51
End: 2021-02-24

## 2021-02-24 DIAGNOSIS — R53.1 ASTHENIA: ICD-10-CM

## 2021-02-24 DIAGNOSIS — E03.9 MYXEDEMA HEART DISEASE: ICD-10-CM

## 2021-02-24 DIAGNOSIS — D64.9 ANEMIA, UNSPECIFIED TYPE: ICD-10-CM

## 2021-02-24 DIAGNOSIS — I51.9 MYXEDEMA HEART DISEASE: ICD-10-CM

## 2021-02-24 DIAGNOSIS — M81.0 SENILE OSTEOPOROSIS: ICD-10-CM

## 2021-02-24 DIAGNOSIS — D64.9 ANEMIA, UNSPECIFIED TYPE: Primary | ICD-10-CM

## 2021-02-24 DIAGNOSIS — E55.9 AVITAMINOSIS D: ICD-10-CM

## 2021-02-24 DIAGNOSIS — R74.8 ACID PHOSPHATASE ELEVATED: ICD-10-CM

## 2021-02-24 LAB
25(OH)D3 SERPL-MCNC: 54.3 NG/ML (ref 30–100)
ALBUMIN SERPL BCP-MCNC: 4 G/DL (ref 3.5–5)
ALP SERPL-CCNC: 111 U/L (ref 46–116)
ALT SERPL W P-5'-P-CCNC: 39 U/L (ref 12–78)
ANION GAP SERPL CALCULATED.3IONS-SCNC: 5 MMOL/L (ref 4–13)
AST SERPL W P-5'-P-CCNC: 29 U/L (ref 5–45)
BILIRUB SERPL-MCNC: 0.5 MG/DL (ref 0.2–1)
BUN SERPL-MCNC: 12 MG/DL (ref 5–25)
CALCIUM SERPL-MCNC: 9.4 MG/DL (ref 8.3–10.1)
CHLORIDE SERPL-SCNC: 106 MMOL/L (ref 100–108)
CK MB SERPL-MCNC: 16.8 NG/ML (ref 0–5)
CK MB SERPL-MCNC: 2.3 % (ref 0–2.5)
CK SERPL-CCNC: 722 U/L (ref 39–308)
CO2 SERPL-SCNC: 29 MMOL/L (ref 21–32)
CREAT SERPL-MCNC: 0.34 MG/DL (ref 0.6–1.3)
ERYTHROCYTE [DISTWIDTH] IN BLOOD BY AUTOMATED COUNT: 11.7 % (ref 11.6–15.1)
GFR SERPL CREATININE-BSD FRML MDRD: 147 ML/MIN/1.73SQ M
GLUCOSE P FAST SERPL-MCNC: 90 MG/DL (ref 65–99)
HCT VFR BLD AUTO: 44 % (ref 36.5–49.3)
HGB BLD-MCNC: 14.2 G/DL (ref 12–17)
MCH RBC QN AUTO: 30.9 PG (ref 26.8–34.3)
MCHC RBC AUTO-ENTMCNC: 32.3 G/DL (ref 31.4–37.4)
MCV RBC AUTO: 96 FL (ref 82–98)
PLATELET # BLD AUTO: 245 THOUSANDS/UL (ref 149–390)
PMV BLD AUTO: 10.7 FL (ref 8.9–12.7)
POTASSIUM SERPL-SCNC: 4.2 MMOL/L (ref 3.5–5.3)
PROT SERPL-MCNC: 7.8 G/DL (ref 6.4–8.2)
RBC # BLD AUTO: 4.59 MILLION/UL (ref 3.88–5.62)
SODIUM SERPL-SCNC: 140 MMOL/L (ref 136–145)
T4 FREE SERPL-MCNC: 1.1 NG/DL (ref 0.76–1.46)
TSH SERPL DL<=0.05 MIU/L-ACNC: 1.91 UIU/ML (ref 0.36–3.74)
WBC # BLD AUTO: 3.28 THOUSAND/UL (ref 4.31–10.16)

## 2021-02-24 PROCEDURE — 84439 ASSAY OF FREE THYROXINE: CPT

## 2021-02-24 PROCEDURE — 36415 COLL VENOUS BLD VENIPUNCTURE: CPT

## 2021-02-24 PROCEDURE — 85027 COMPLETE CBC AUTOMATED: CPT

## 2021-02-24 PROCEDURE — 82550 ASSAY OF CK (CPK): CPT

## 2021-02-24 PROCEDURE — 82553 CREATINE MB FRACTION: CPT

## 2021-02-24 PROCEDURE — 84443 ASSAY THYROID STIM HORMONE: CPT

## 2021-02-24 PROCEDURE — 82306 VITAMIN D 25 HYDROXY: CPT

## 2021-02-24 PROCEDURE — 80053 COMPREHEN METABOLIC PANEL: CPT

## 2021-05-14 ENCOUNTER — NURSING HOME VISIT (OUTPATIENT)
Dept: GERIATRICS | Facility: OTHER | Age: 51
End: 2021-05-14
Payer: MEDICARE

## 2021-05-14 DIAGNOSIS — R26.2 AMBULATORY DYSFUNCTION: ICD-10-CM

## 2021-05-14 DIAGNOSIS — F41.8 DEPRESSION WITH ANXIETY: ICD-10-CM

## 2021-05-14 DIAGNOSIS — G47.09 OTHER INSOMNIA: Primary | ICD-10-CM

## 2021-05-14 DIAGNOSIS — J30.1 ALLERGIC RHINITIS DUE TO POLLEN, UNSPECIFIED SEASONALITY: ICD-10-CM

## 2021-05-14 DIAGNOSIS — G71.01 BECKER'S MUSCULAR DYSTROPHY (HCC): Chronic | ICD-10-CM

## 2021-05-14 PROCEDURE — 99305 1ST NF CARE MODERATE MDM 35: CPT | Performed by: FAMILY MEDICINE

## 2021-05-14 NOTE — PROGRESS NOTES
[address] [place], [place], [address] [address]  History and Physical  POS: 31    Records Reviewed include: Hospital records      Chief Complaint/ Reason for Admission:   B/l tibial fracture     History of Present Illness:       Mr Kristopher Vickers is a 44 yo male who was recently in inpatient rehab at [facility] due to b/l tibial fracture treated conservatively  Will be admitted to [facility] for [address] [place] and plan is to go home afterwards  Will continue PT/OT , monitor for pain  Pt is wheelchair bound at baseline due to Suazo's muscular dystrophy  He denies CP, SOB, cough, fever, chills  He reports difficulty sleeping and stated he is upset to be in this facility, he would have rather continue therapy at [facility]  Allergies    Allergies   Allergen Reactions    Anesthesia S-I-40 [Propofol] Other (See Comments)     Malignant hypothermia       Past Medical History  Past Medical History:   Diagnosis Date    Allergic rhinitis 6/16/2020    Suazo's muscular dystrophy ([address] )     since 9th grade        Past Surgical History:   Procedure Laterality Date    MUSCLE BIOPSY         Family History  Family History   Problem Relation Age of Onset    Heart disease Sister        Social History  Social History     Tobacco Use   Smoking Status Never Smoker   Smokeless Tobacco Never Used      Social History     Substance and Sexual Activity   Alcohol Use Yes    Alcohol/week: 1 0 standard drinks    Types: 1 Glasses of wine per week      Social History     Substance and Sexual Activity   Drug Use No        Lives: Home,  Social Support: family  Fall in the past 12 months: yes  Use of assistance Device: Wheelchair    Physical Exam    Vital Signs    Vitals:    05/15/21 1411   BP: 139/78   Pulse: 85   Resp: 18   Temp: 97 8 °F (36 6 °C)   SpO2: 97%           Constitutional: Frail appearing patient       Physical Exam  Vitals signs and nursing note reviewed     Constitutional:       General: He is not in acute distress  Appearance: He is not diaphoretic  HENT:      Head: Normocephalic and atraumatic  Eyes:      General:         Right eye: No discharge  Left eye: No discharge  Pupils: Pupils are equal, round, and reactive to light  Neck:      Musculoskeletal: Normal range of motion and neck supple  Cardiovascular:      Rate and Rhythm: Normal rate and regular rhythm  Heart sounds: Normal heart sounds  No murmur  Pulmonary:      Effort: Pulmonary effort is normal  No respiratory distress  Breath sounds: Normal breath sounds  No wheezing  Abdominal:      Palpations: Abdomen is soft  Tenderness: There is no abdominal tenderness  There is no guarding or rebound  Musculoskeletal:         General: Signs of injury present  Right lower leg: No edema  Left lower leg: No edema  Comments: Wheelchair  B/l LE braces   Skin:     General: Skin is warm and dry  Neurological:      Mental Status: He is alert and oriented to person, place, and time  Mental status is at baseline  Psychiatric:      Comments: Dysphoric mood/anxiety         Review of Systems:  Review of Systems   Constitutional: Negative for chills, fatigue and fever  HENT: Negative for congestion, rhinorrhea and sore throat  Respiratory: Negative for cough, shortness of breath and wheezing  Gastrointestinal: Negative for abdominal pain, constipation and nausea  Genitourinary: Negative for dysuria and hematuria  Musculoskeletal: Positive for gait problem  Skin: Negative for rash and wound  Allergic/Immunologic: Positive for environmental allergies  Neurological: Negative for dizziness and syncope  Hematological: Does not bruise/bleed easily  Psychiatric/Behavioral: Positive for sleep disturbance  The patient is nervous/anxious  List of Current Medications:    Medication reviewed  All orders signed  Complete list is in the paper chart       Allergies    Allergies   Allergen Reactions    Anesthesia S-I-40 [Propofol] Other (See Comments)     Malignant hypothermia       Labs/Diagnostics (reviewed by this provider): I personally reviewed lab results and imaging studies  Full reports are in the paper chart  Assessment/Plan:    Left tibial fracture  Will continue conservative treatment  Follow up with Ortho  Monitor and manage pain as needed  Continue Pt/OT  Right tibial fracture  Will continue conservative treatment  Follow up with Ortho  Monitor and manage pain as needed  Continue Pt/OT  Other insomnia  Due to change in environment  Will start mirtazapine 15 mg po QHS and monitor  Ambulatory dysfunction  Will continue PT/OT  Placed on fall precautions  The goal is to return home    Suazo's muscular dystrophy [facility])  Continue PT/OT  Provide supportive care  Follow up with Neurology as outpatient    Allergic rhinitis  Currently controlled with current regimen  Will follow up    Depression with anxiety  Will continue to provide supportive care  Place psychologist consult    Hold on pharmacological for now         Pain: no  Rehab Potential:Good  Patient Informed of Medical Condition: yes   Patient is Capable of Understanding Their Right: yes   Discharge Plan: home  Vaccination:   Immunization History   Administered Date(s) Administered    Hep A / Hep B 12/19/2005, 01/20/2006, 07/24/2006    INFLUENZA 12/04/2013, 12/14/2014, 12/08/2015    Influenza Injectable, MDCK, Preservative Free, Quadrivalent 11/22/2019    Tdap 07/24/2006     Advanced Directives: yes: Yes   Code status:Full Code  PCP: MD Josh Baker MD  Geriatric Medicine  5/14/21  2:13 PM

## 2021-05-15 VITALS
TEMPERATURE: 97.8 F | SYSTOLIC BLOOD PRESSURE: 139 MMHG | HEART RATE: 85 BPM | OXYGEN SATURATION: 97 % | DIASTOLIC BLOOD PRESSURE: 78 MMHG | RESPIRATION RATE: 18 BRPM

## 2021-05-15 PROBLEM — S82.202A LEFT TIBIAL FRACTURE: Status: ACTIVE | Noted: 2021-05-15

## 2021-05-15 PROBLEM — R26.2 AMBULATORY DYSFUNCTION: Status: ACTIVE | Noted: 2021-05-15

## 2021-05-15 PROBLEM — F41.8 DEPRESSION WITH ANXIETY: Status: ACTIVE | Noted: 2021-05-15

## 2021-05-15 PROBLEM — G47.09 OTHER INSOMNIA: Status: ACTIVE | Noted: 2021-05-15

## 2021-05-15 PROBLEM — S82.201A RIGHT TIBIAL FRACTURE: Status: ACTIVE | Noted: 2021-05-15

## 2021-05-15 NOTE — ASSESSMENT & PLAN NOTE
Will continue conservative treatment  Follow up with Ortho  Monitor and manage pain as needed  Continue Pt/OT

## 2021-05-15 NOTE — ASSESSMENT & PLAN NOTE
Will continue to provide supportive care  Place psychologist consult    Hold on pharmacological for now

## 2021-05-18 ENCOUNTER — NURSING HOME VISIT (OUTPATIENT)
Dept: GERIATRICS | Facility: OTHER | Age: 51
End: 2021-05-18
Payer: MEDICARE

## 2021-05-18 VITALS
SYSTOLIC BLOOD PRESSURE: 134 MMHG | TEMPERATURE: 98.2 F | HEART RATE: 80 BPM | DIASTOLIC BLOOD PRESSURE: 92 MMHG | OXYGEN SATURATION: 94 % | RESPIRATION RATE: 16 BRPM

## 2021-05-18 DIAGNOSIS — S82.101P CLOSED FRACTURE OF PROXIMAL END OF RIGHT TIBIA WITH MALUNION, UNSPECIFIED FRACTURE MORPHOLOGY, SUBSEQUENT ENCOUNTER: ICD-10-CM

## 2021-05-18 DIAGNOSIS — R26.2 AMBULATORY DYSFUNCTION: ICD-10-CM

## 2021-05-18 DIAGNOSIS — G71.01 BECKER'S MUSCULAR DYSTROPHY (HCC): Primary | Chronic | ICD-10-CM

## 2021-05-18 DIAGNOSIS — G47.09 OTHER INSOMNIA: ICD-10-CM

## 2021-05-18 DIAGNOSIS — F41.8 DEPRESSION WITH ANXIETY: ICD-10-CM

## 2021-05-18 DIAGNOSIS — S82.102D CLOSED FRACTURE OF PROXIMAL END OF LEFT TIBIA WITH ROUTINE HEALING, UNSPECIFIED FRACTURE MORPHOLOGY, SUBSEQUENT ENCOUNTER: ICD-10-CM

## 2021-05-18 PROCEDURE — 99309 SBSQ NF CARE MODERATE MDM 30: CPT | Performed by: NURSE PRACTITIONER

## 2021-05-18 NOTE — ASSESSMENT & PLAN NOTE
· Improved  · Continue with mirtazapine 50 mg q h s   · Maintain sleep-wake cycle  · Avoid napping throughout the day  · Avoid caffeine in the afternoon  · Continue to monitor

## 2021-05-18 NOTE — ASSESSMENT & PLAN NOTE
· PT/OT following  · Continues to be NWB to b/l LE until follow up with orthpedics  · Currently requires estuardo lift oob  · Fall precautions  · Continue to monitor

## 2021-05-18 NOTE — ASSESSMENT & PLAN NOTE
· Situational, unchanged from previous  · Denies SI/HI  · Does not wish to be started on any medications at this time  · Continue to provide supportive care  · Continue to monitor

## 2021-05-18 NOTE — PROGRESS NOTES
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)     · PT/OT following  · Follow up with neurology as recommended  · Continue to monitor          Left tibial fracture     · Continue with conservative treatment  · Pain well controlled  · Patient remains nonweightbearing with I ROM brace to left lower extremity  · Follow-up with orthopedics as recommended  · Continue with PT/OT         Right tibial fracture     · Continue with conservative management  · Pain well controlled, will change muscle relaxer from PRN to scheduled for muscle spasm  · Remains nonweightbearing with knee immobilizer in place   · Follow-up with orthopedics as recommended    · Continue with PT/OT            Other    Other insomnia     · Improved  · Continue with mirtazapine 50 mg q h s   · Maintain sleep-wake cycle  · Avoid napping throughout the day  · Avoid caffeine in the afternoon  · Continue to monitor         Ambulatory dysfunction     · PT/OT following  · Continues to be NWB to b/l LE until follow up with orthpedics  · Currently requires estuardo lift oob  · Fall precautions  · Continue to monitor          Depression with anxiety     · Situational, unchanged from previous  · Denies SI/HI  · Does not wish to be started on any medications at this time  · Continue to provide supportive care  · Continue to monitor               No orders of the defined types were placed in this encounter       - Counseling Documentation: patient was counseled regarding: risks and benefits of treatment options and importance of compliance with treatment    Chief Complaint     No chief complaint on file        History of Present Illness     Annabelle Gorman Heather Lozada is a 50-year-old male at [facility] for short-term rehab after recent hospitalization for bilateral tibial fractures, treated conservatively  Came here after about a month at [facility]  He is being seen today for follow-up on his chronic medical conditions  His comorbidities include muscle dystrophy, vitamin-D deficiency, allergic rhinitis, and ambulatory dysfunction  Upon examination patient was lying in bed resting comfortably  Offered no complaints  Pain is well controlled  He has a good appetite and is eating well  His insomnia has improved since being started on mirtazapine  His mood is also improved, he denies suicidal/ homicidal ideations  His goal is to be discharged home and looks forward to that  Denies headaches, dizziness, vision changes, chest pain, shortness of breath, cough, abdominal pain, and GI  upset  Per nursing no acute concerns issues at this time  The following portions of the patient's history were reviewed and updated as appropriate: allergies, current medications, past family history, past medical history, past social history, past surgical history and problem list     Review of Systems     Review of Systems   Constitutional: Negative for chills and fever  HENT: Negative for ear pain and sore throat  Eyes: Negative for pain and visual disturbance  Respiratory: Negative for cough and shortness of breath  Cardiovascular: Negative for chest pain and palpitations  Gastrointestinal: Negative for abdominal pain and vomiting  Genitourinary: Negative for dysuria and hematuria  Musculoskeletal: Positive for gait problem  Negative for arthralgias, back pain and joint swelling  Skin: Negative for color change and rash  Neurological: Positive for weakness  Negative for seizures and syncope         Active Problem List     Patient Active Problem List   Diagnosis    Suazo's muscular dystrophy ([address] )    Allergic rhinitis    Left tibial fracture    Right tibial fracture    Other insomnia    Ambulatory dysfunction    Depression with anxiety       Objective     /92   Pulse 80   Temp 98 2 °F (36 8 °C)   Resp 16   SpO2 94%     Physical Exam  Constitutional:       General: He is not in acute distress  Appearance: Normal appearance  He is normal weight  He is not ill-appearing  HENT:      Head: Normocephalic  Mouth/Throat:      Mouth: Mucous membranes are moist    Eyes:      General:         Right eye: No discharge  Left eye: No discharge  Cardiovascular:      Rate and Rhythm: Normal rate and regular rhythm  Pulses: Normal pulses  Heart sounds: Normal heart sounds  No murmur  Pulmonary:      Effort: Pulmonary effort is normal  No respiratory distress  Breath sounds: Normal breath sounds  No wheezing  Abdominal:      General: Bowel sounds are normal  There is no distension  Palpations: Abdomen is soft  Tenderness: There is no abdominal tenderness  Musculoskeletal:         General: Signs of injury present  Right lower leg: No edema  Left lower leg: No edema  Comments: Knee immobilizer to RLW, IROM brace to LLE   Skin:     General: Skin is warm and dry  Coloration: Skin is not jaundiced or pale  Neurological:      General: No focal deficit present  Mental Status: He is alert and oriented to person, place, and time  Mental status is at baseline  Motor: Weakness (secondary to chronic medical conditions) present  Gait: Gait abnormal (NWB to b/l LE, wheelchair OOB)  Psychiatric:         Mood and Affect: Mood normal          Behavior: Behavior normal          Thought Content: Thought content normal  Thought content does not include homicidal or suicidal ideation  Thought content does not include homicidal or suicidal plan  Pertinent Laboratory/Diagnostic Studies:  Labs reviewed in facility paper chart      Current Medications   Medications were reviewed and updated  Please refer to paper chart for updated list of medications  Erlinda CAMEJO  5/18/2021 5:19 PM    Please note:  Voice-recognition software may have been used in the preparation of this document  Occasional wrong word or "sound-alike" substitutions may have occurred due to the inherent limitations of voice recognition software  Interpretation should be guided by context

## 2021-05-18 NOTE — ASSESSMENT & PLAN NOTE
· Continue with conservative management  · Pain well controlled, will change muscle relaxer from PRN to scheduled for muscle spasm  · Remains nonweightbearing with knee immobilizer in place   · Follow-up with orthopedics as recommended    · Continue with PT/OT

## 2021-05-18 NOTE — ASSESSMENT & PLAN NOTE
· Continue with conservative treatment  · Pain well controlled  · Patient remains nonweightbearing with I ROM brace to left lower extremity  · Follow-up with orthopedics as recommended  · Continue with PT/OT

## 2021-05-21 ENCOUNTER — NURSING HOME VISIT (OUTPATIENT)
Dept: GERIATRICS | Facility: OTHER | Age: 51
End: 2021-05-21
Payer: MEDICARE

## 2021-05-21 VITALS
DIASTOLIC BLOOD PRESSURE: 75 MMHG | HEART RATE: 75 BPM | SYSTOLIC BLOOD PRESSURE: 127 MMHG | OXYGEN SATURATION: 96 % | TEMPERATURE: 98 F | RESPIRATION RATE: 18 BRPM

## 2021-05-21 DIAGNOSIS — G47.09 OTHER INSOMNIA: ICD-10-CM

## 2021-05-21 DIAGNOSIS — S82.101P CLOSED FRACTURE OF PROXIMAL END OF RIGHT TIBIA WITH MALUNION, UNSPECIFIED FRACTURE MORPHOLOGY, SUBSEQUENT ENCOUNTER: ICD-10-CM

## 2021-05-21 DIAGNOSIS — J30.1 ALLERGIC RHINITIS DUE TO POLLEN, UNSPECIFIED SEASONALITY: Primary | ICD-10-CM

## 2021-05-21 DIAGNOSIS — G71.01 BECKER'S MUSCULAR DYSTROPHY (HCC): Chronic | ICD-10-CM

## 2021-05-21 DIAGNOSIS — S82.102D CLOSED FRACTURE OF PROXIMAL END OF LEFT TIBIA WITH ROUTINE HEALING, UNSPECIFIED FRACTURE MORPHOLOGY, SUBSEQUENT ENCOUNTER: ICD-10-CM

## 2021-05-21 DIAGNOSIS — R26.2 AMBULATORY DYSFUNCTION: ICD-10-CM

## 2021-05-21 PROCEDURE — 99309 SBSQ NF CARE MODERATE MDM 30: CPT | Performed by: NURSE PRACTITIONER

## 2021-05-21 NOTE — ASSESSMENT & PLAN NOTE
· Improved since starting on melatonin and mirtazapine 15mg  · Maintain sleep wake cycle  · Avoid napping throughout he day  · Avoid caffeine in the afternoon  · Continue to monitor

## 2021-05-21 NOTE — ASSESSMENT & PLAN NOTE
· Currently stable  · Continue with current medication regimen  · Avoid triggers  · Continue to monitor

## 2021-05-21 NOTE — ASSESSMENT & PLAN NOTE
· Stable, continue with IROM brace  · [pain is well controlled  · Follow up with orthopedics as recommended  · Continue with Lovenox injections as per orthopedics recommendations  · Continue to monitor

## 2021-05-21 NOTE — ASSESSMENT & PLAN NOTE
· PT/OT following  · NWB to b/l LE  · Knee immobilizer to RLE and IROM brace to LLE  · Fall precautions  · continue with use of slide board to get OOB to the wheelchair   · Continue with PT/OT for continued strength and balance training

## 2021-05-21 NOTE — ASSESSMENT & PLAN NOTE
· Stable, knee immobilizer remains in place  · Continue with nonweightbearing status until follow-up appointment with Orthopedics injuring  · Pain is well controlled, continue with Robaxin and p r n  home  · Follow-up with orthopedics as recommended  · Continue to monitor

## 2021-05-21 NOTE — PROGRESS NOTES
[facility]  [address]  [phone]  Senior Care SNF List: [facility]   Code 31      NAME: Robles Bird  AGE: 45 y o  SEX: male    DATE OF ENCOUNTER: 5/21/2021    Assessment and Plan     Problem List Items Addressed This Visit        Respiratory    Allergic rhinitis - Primary     · Currently stable  · Continue with current medication regimen  · Avoid triggers  · Continue to monitor            Musculoskeletal and Integument    Suazo's muscular dystrophy ([address] ) (Chronic)     · PT/OT following  · Follow up with neurology as recommended  · Continue to monitor          Left tibial fracture     · Stable, continue with IROM brace  · [pain is well controlled  · Follow up with orthopedics as recommended  · Continue with Lovenox injections as per orthopedics recommendations  · Continue to monitor          Right tibial fracture     · Stable, knee immobilizer remains in place  · Continue with nonweightbearing status until follow-up appointment with Orthopedics injuring  · Pain is well controlled, continue with Robaxin and p r n  home  · Follow-up with orthopedics as recommended  · Continue to monitor            Other    Other insomnia     · Improved since starting on melatonin and mirtazapine 15mg  · Maintain sleep wake cycle  · Avoid napping throughout he day  · Avoid caffeine in the afternoon  · Continue to monitor          Ambulatory dysfunction     · PT/OT following  · NWB to b/l LE  · Knee immobilizer to RLE and IROM brace to LLE  · Fall precautions  · continue with use of slide board to get OOB to the wheelchair   · Continue with PT/OT for continued strength and balance training               No orders of the defined types were placed in this encounter       - Counseling Documentation: patient was counseled regarding: risks and benefits of treatment options and importance of compliance with treatment    Chief Complaint     No chief complaint on file        History of Present Jean Artis is a 58-year-old male at [facility] for short-term rehab  He is being seen today for follow-up on his chronic medical conditions  His comorbidities include muscle dystrophy, vitamin-D deficiency, and ambulatory dysfunction  Upon examination patient had just gotten out of bed into his wheelchair with the assistance of PT  Is able to use a slide board to get from the bed into the wheelchair  He said he was feeling good and offered no complaints  His pain is well controlled  He is sleeping better at night  He has a good appetite is eating well  He denies headaches, dizziness, vision change, chest pain, shortness breath, cough, abdominal pain, and GI  upset  Per nursing no acute concerns issues at this time  The following portions of the patient's history were reviewed and updated as appropriate: allergies, current medications, past family history, past medical history, past social history, past surgical history and problem list     Review of Systems     Review of Systems   Constitutional: Negative for activity change, appetite change, chills, fatigue and fever  HENT: Negative for ear pain and sore throat  Eyes: Negative for visual disturbance  Respiratory: Negative for cough and shortness of breath  Cardiovascular: Negative for chest pain and palpitations  Gastrointestinal: Negative for abdominal pain and vomiting  Genitourinary: Negative for dysuria and hematuria  Musculoskeletal: Positive for gait problem  Negative for arthralgias, back pain and joint swelling  Skin: Negative for color change and rash  Neurological: Positive for weakness  Negative for dizziness, syncope, light-headedness and headaches  Psychiatric/Behavioral: Negative for sleep disturbance         Active Problem List     Patient Active Problem List   Diagnosis    Suazo's muscular dystrophy ([facility] [address] )    Allergic rhinitis    Left tibial fracture    Right tibial fracture    Other insomnia    Ambulatory dysfunction    Depression with anxiety       Objective     /75   Pulse 75   Temp 98 °F (36 7 °C)   Resp 18   SpO2 96%     Physical Exam  Constitutional:       General: He is not in acute distress  Appearance: Normal appearance  He is normal weight  He is not ill-appearing  HENT:      Head: Normocephalic  Mouth/Throat:      Mouth: Mucous membranes are moist    Eyes:      General:         Right eye: No discharge  Left eye: No discharge  Cardiovascular:      Rate and Rhythm: Normal rate  Rhythm irregular  Pulses: Normal pulses  Heart sounds: Normal heart sounds  No murmur  Pulmonary:      Effort: Pulmonary effort is normal  No respiratory distress  Breath sounds: Normal breath sounds  No wheezing  Abdominal:      General: Bowel sounds are normal  There is no distension  Palpations: Abdomen is soft  There is no mass  Tenderness: There is no abdominal tenderness  Musculoskeletal:         General: Signs of injury present  No tenderness  Right lower leg: No edema  Left lower leg: No edema  Comments: Knee immobilizer to RLW, IROM brace to LLE   Skin:     General: Skin is warm and dry  Coloration: Skin is not jaundiced or pale  Neurological:      General: No focal deficit present  Mental Status: He is alert and oriented to person, place, and time  Mental status is at baseline  Motor: Weakness (secondary to chronic medical conditions) present  Gait: Gait abnormal (NWB to b/l LE, wheelchair OOB)  Psychiatric:         Mood and Affect: Mood normal          Behavior: Behavior normal          Thought Content: Thought content normal  Thought content does not include homicidal or suicidal ideation  Thought content does not include homicidal or suicidal plan  Pertinent Laboratory/Diagnostic Studies:  Labs reviewed in facility paper chart      Current Medications   Medications were reviewed and updated  Please refer to paper chart for updated list of medications  Bailee Shoulder CRNP  5/21/2021 11:55 AM    Please note:  Voice-recognition software may have been used in the preparation of this document  Occasional wrong word or "sound-alike" substitutions may have occurred due to the inherent limitations of voice recognition software  Interpretation should be guided by context

## 2021-05-25 ENCOUNTER — NURSING HOME VISIT (OUTPATIENT)
Dept: GERIATRICS | Facility: OTHER | Age: 51
End: 2021-05-25
Payer: MEDICARE

## 2021-05-25 VITALS
DIASTOLIC BLOOD PRESSURE: 89 MMHG | TEMPERATURE: 98.1 F | RESPIRATION RATE: 18 BRPM | SYSTOLIC BLOOD PRESSURE: 133 MMHG | HEART RATE: 87 BPM | OXYGEN SATURATION: 98 %

## 2021-05-25 DIAGNOSIS — R26.2 AMBULATORY DYSFUNCTION: ICD-10-CM

## 2021-05-25 DIAGNOSIS — S82.101P CLOSED FRACTURE OF PROXIMAL END OF RIGHT TIBIA WITH MALUNION, UNSPECIFIED FRACTURE MORPHOLOGY, SUBSEQUENT ENCOUNTER: ICD-10-CM

## 2021-05-25 DIAGNOSIS — G71.01 BECKER'S MUSCULAR DYSTROPHY (HCC): Primary | Chronic | ICD-10-CM

## 2021-05-25 DIAGNOSIS — S82.102D CLOSED FRACTURE OF PROXIMAL END OF LEFT TIBIA WITH ROUTINE HEALING, UNSPECIFIED FRACTURE MORPHOLOGY, SUBSEQUENT ENCOUNTER: ICD-10-CM

## 2021-05-25 DIAGNOSIS — F41.8 DEPRESSION WITH ANXIETY: ICD-10-CM

## 2021-05-25 PROCEDURE — 99309 SBSQ NF CARE MODERATE MDM 30: CPT | Performed by: NURSE PRACTITIONER

## 2021-05-25 NOTE — ASSESSMENT & PLAN NOTE
· PT/OT following  · NWB to b/le LE until follow up with orthopedics in June  · Jack lift or slide bord OOB to wheelchair  · Fall precautions  · Continue with pt/ot for continued strength and balance training  · Continue to monitor

## 2021-05-25 NOTE — ASSESSMENT & PLAN NOTE
· Stable  · Continue with irom brace  · Continue with pt/ot  · Follow up with ortho as recommended  · Continue to monitor

## 2021-05-25 NOTE — ASSESSMENT & PLAN NOTE
· Stable, previously denied starting on antidepressant   · Continue to provide supportive care  · Continue to monitor

## 2021-05-25 NOTE — PROGRESS NOTES
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)     · Stable, pt/ot following  · Follow up with neurology as recommended   · Continue to monitor          Left tibial fracture     · Stable  · Continue with irom brace  · Continue with pt/ot  · Follow up with ortho as recommended  · Continue to monitor         Right tibial fracture     · Stable  · Continue with knee immobilizer  · Continue with pt/ot  · Follow up with ortho as recommended  · Continue to monitor             Other    Ambulatory dysfunction     · PT/OT following  · NWB to b/le LE until follow up with orthopedics in June  · Jack lift or slide bord OOB to wheelchair  · Fall precautions  · Continue with pt/ot for continued strength and balance training  · Continue to monitor          Depression with anxiety     · Stable, previously denied starting on antidepressant   · Continue to provide supportive care  · Continue to monitor                No orders of the defined types were placed in this encounter       - Counseling Documentation: patient was counseled regarding: risks and benefits of treatment options and importance of compliance with treatment    Chief Complaint     No chief complaint on file  History of Present Illness     Yi Arrington is a 45year old female at [facility] is a 54-year-old male at [facility] for short-term rehab  He is being seen today for follow-up on his chronic medical conditions  His comorbidities include Suazo's muscle dystrophy, depression, anxiety, and ambulatory dysfunction  Upon examination patient was lying in bed resting comfortably    She is feeling good and offered no complaints  He has a good appetite and eating well  He is sleeping better at night  His pain is well controlled, but sometimes does feel stiff  He denies headaches, dizziness, vision change, chest pain, shortness of breath, cough, abdominal pain, and GI  upset  Per nursing no acute concerns issues at this time      The following portions of the patient's history were reviewed and updated as appropriate: allergies, current medications, past family history, past medical history, past social history, past surgical history and problem list     Review of Systems     Review of Systems   Constitutional: Negative for activity change, appetite change, chills, fatigue and fever  HENT: Negative for ear pain and sore throat  Eyes: Negative for visual disturbance  Respiratory: Negative for cough and shortness of breath  Cardiovascular: Negative for chest pain and palpitations  Gastrointestinal: Negative for abdominal pain and vomiting  Genitourinary: Negative for dysuria and hematuria  Musculoskeletal: Positive for arthralgias and gait problem  Negative for back pain and joint swelling  Skin: Negative for color change and rash  Neurological: Positive for weakness  Negative for dizziness, syncope, light-headedness and headaches  Psychiatric/Behavioral: Negative for dysphoric mood, sleep disturbance and suicidal ideas  Active Problem List     Patient Active Problem List   Diagnosis    Suazo's muscular dystrophy ([facility] [address] )    Allergic rhinitis    Left tibial fracture    Right tibial fracture    Other insomnia    Ambulatory dysfunction    Depression with anxiety       Objective     /89   Pulse 87   Temp 98 1 °F (36 7 °C)   Resp 18   SpO2 98%     Physical Exam  Constitutional:       General: He is not in acute distress  Appearance: Normal appearance  He is normal weight  He is not ill-appearing  HENT:      Head: Normocephalic        Mouth/Throat:      Mouth: Mucous membranes are moist    Eyes:      General:         Right eye: No discharge  Left eye: No discharge  Cardiovascular:      Rate and Rhythm: Normal rate and regular rhythm  Pulses: Normal pulses  Heart sounds: Normal heart sounds  No murmur  Pulmonary:      Effort: Pulmonary effort is normal  No respiratory distress  Breath sounds: Normal breath sounds  No wheezing  Abdominal:      General: Bowel sounds are normal  There is no distension  Palpations: Abdomen is soft  There is no mass  Tenderness: There is no abdominal tenderness  Musculoskeletal:         General: Signs of injury present  No swelling or tenderness  Right lower leg: No edema  Left lower leg: No edema  Comments: Knee immobilizer to RLW, IROM brace to LLE   Skin:     General: Skin is warm and dry  Coloration: Skin is not jaundiced or pale  Findings: No rash  Neurological:      General: No focal deficit present  Mental Status: He is alert and oriented to person, place, and time  Mental status is at baseline  Motor: Weakness (secondary to chronic medical conditions) present  Gait: Gait abnormal (NWB to b/l LE, wheelchair OOB)  Psychiatric:         Mood and Affect: Mood normal          Behavior: Behavior normal          Thought Content: Thought content normal  Thought content does not include homicidal or suicidal ideation  Thought content does not include homicidal or suicidal plan  Pertinent Laboratory/Diagnostic Studies:  Labs reviewed in facility paper chart  Current Medications   Medications were reviewed and updated  Please refer to paper chart for updated list of medications  Noah CAMEJO  5/25/2021 2:13 PM    Please note:  Voice-recognition software may have been used in the preparation of this document  Occasional wrong word or "sound-alike" substitutions may have occurred due to the inherent limitations of voice recognition software    Interpretation should be guided by context

## 2021-05-25 NOTE — ASSESSMENT & PLAN NOTE
· Stable  · Continue with knee immobilizer  · Continue with pt/ot  · Follow up with ortho as recommended  · Continue to monitor

## 2021-05-27 ENCOUNTER — NURSING HOME VISIT (OUTPATIENT)
Dept: GERIATRICS | Facility: OTHER | Age: 51
End: 2021-05-27
Payer: MEDICARE

## 2021-05-27 DIAGNOSIS — G71.01 BECKER'S MUSCULAR DYSTROPHY (HCC): Chronic | ICD-10-CM

## 2021-05-27 DIAGNOSIS — S82.101P CLOSED FRACTURE OF PROXIMAL END OF RIGHT TIBIA WITH MALUNION, UNSPECIFIED FRACTURE MORPHOLOGY, SUBSEQUENT ENCOUNTER: Primary | ICD-10-CM

## 2021-05-27 DIAGNOSIS — S82.102D CLOSED FRACTURE OF PROXIMAL END OF LEFT TIBIA WITH ROUTINE HEALING, UNSPECIFIED FRACTURE MORPHOLOGY, SUBSEQUENT ENCOUNTER: ICD-10-CM

## 2021-05-27 DIAGNOSIS — F41.8 DEPRESSION WITH ANXIETY: ICD-10-CM

## 2021-05-27 PROCEDURE — 99309 SBSQ NF CARE MODERATE MDM 30: CPT | Performed by: NURSE PRACTITIONER

## 2021-05-27 NOTE — PROGRESS NOTES
[address], [address] [place] [address], [address] [place] [address]  [phone]    NAME: Tracy Coyle  AGE: 45 y o  SEX: male    Progress Note    Location: [facility]  POS: 32 (SNF)    Assessment/Plan:    Right tibial fracture  Denies any pain on this visit  Nursing to continue to assess for pain Q shift  Continue 24/7 SNF supportive care and management  COntinue PT/OT as scheduled  Continue Lovenox 30mg daily Q12 hours  Continue Robaxin 500mg Q8 hours  Fall precaution  NWB until cleared by Orthopedic office  Continue use of Immobilizer to RLE   Pending Orthopedic appointment on Vanessa 15, 2021    Left tibial fracture  Please see management in setting of #1  Continue use of IROM brace to LLE    Suazo's muscular dystrophy ([address] )  Continue 24/7 SNF supportive care and managementt    Depression with anxiety  Stable  Continue Melatonin 10mg daily at  + Mirtazapine 15mg at [facility]      Chief complaint / Reason for visit: Follow-up visit    History of Present Illness: This is a 58-year-old male patient currently admitted at [facility] (5/13/2021 to present) following discharge from acute care hospitalization (LVH) with Dx of Closed Fracture of Proximal end of Left tibia and Closed fracture of Proximal end of Right tibia with malunion  Patient is seen and examined today today to follow-up acute and chronic medical conditions as mentioned above and Suazo's Dystrophy, Depression and ambulatory dysfunction  Patient is in bed for this visit - alert, cooperative, calm, pleasant and not in distress  Patient is verbal with clear coherent speech - oriented to name/birthday/date  Patient denies any acute medical concerns for this visit including pain, chest pain, SOB, headache, fever/chills, dizziness/ vertigo, GI/ related concerns, upper respiratory symptoms including cough and sore throat nor sleep/ mood and appetite concerns   Patient expressed concerns about current Lovenox therapy - explanation on use, purpose and limited duration of therapy of medication discussed on this visit - patient acknowledged understanding  Per nursing, no acute medical concerns for this visit  Review of Systems:  Per history of present illness, all other systems reviewed and negative  HISTORY:  Medical Hx: Reviewed, unchanged  Family Hx: Reviewed, unchanged  Soc Hx: Reviewed,  unchanged    ALLERGY: Reviewed, unchanged  Allergies   Allergen Reactions    Anesthesia S-I-40 [Propofol] Other (See Comments)     Malignant hypothermia        PHYSICAL EXAM:  Vital Signs: T97 7F -P83 -R18 SpO2; 97% RA  Weight: 158 9 lbs (5/26/2021)    General: NAD  Head: Atraumatic  Normocephalic  Eye Exam: anicteric sclera, no discharge, PERRLA, No injection  Oral Exam: moist mucous membranes, no buccaloropharyngeal erythema, palatine tonsils WNL  Neck Exam: no anterior cervical lymphadenopathy noted, neck supple  Cardiovascular: regular rate, regular rhythm, no murmurs, rubs, or gallops  Pulmonary: no wheeze, no rhonchi, no rales  No chest tenderness  Abdominal: soft, non-tender, nondistended, bowel sounds audible x 4 quadrants  : Non distended bladder  Continent  Extremities and skin: no edema noted, no rashes  Intact skin  NWB to BLE: IROM and Immobilizer in place to BLE  Neurological: alert, cooperative and responsive, Oriented x 3, Non ambulatory - wheelchair bound  Psych: Calm, cooperative, verbally engaged  Laboratory results / Imaging reviewed: NO new lab results to review at this time  Current Medications:   All medications reviewed and updated in [facility]   5/27/2021

## 2021-05-27 NOTE — ASSESSMENT & PLAN NOTE
Denies any pain on this visit  Nursing to continue to assess for pain Q shift  Continue 24/7 SNF supportive care and management  COntinue PT/OT as scheduled  Continue Lovenox 30mg daily Q12 hours  Continue Robaxin 500mg Q8 hours  Fall precaution   NWB until cleared by Orthopedic office  Continue use of Immobilizer to RLE   Pending Orthopedic appointment on Vanessa 15, 2021

## 2021-06-02 ENCOUNTER — NURSING HOME VISIT (OUTPATIENT)
Dept: GERIATRICS | Facility: OTHER | Age: 51
End: 2021-06-02
Payer: MEDICARE

## 2021-06-02 VITALS
TEMPERATURE: 96.9 F | OXYGEN SATURATION: 97 % | HEART RATE: 88 BPM | DIASTOLIC BLOOD PRESSURE: 72 MMHG | SYSTOLIC BLOOD PRESSURE: 118 MMHG | RESPIRATION RATE: 18 BRPM

## 2021-06-02 DIAGNOSIS — S82.101P CLOSED FRACTURE OF PROXIMAL END OF RIGHT TIBIA WITH MALUNION, UNSPECIFIED FRACTURE MORPHOLOGY, SUBSEQUENT ENCOUNTER: ICD-10-CM

## 2021-06-02 DIAGNOSIS — G71.01 BECKER'S MUSCULAR DYSTROPHY (HCC): Chronic | ICD-10-CM

## 2021-06-02 DIAGNOSIS — R26.2 AMBULATORY DYSFUNCTION: Primary | ICD-10-CM

## 2021-06-02 DIAGNOSIS — S82.102D CLOSED FRACTURE OF PROXIMAL END OF LEFT TIBIA WITH ROUTINE HEALING, UNSPECIFIED FRACTURE MORPHOLOGY, SUBSEQUENT ENCOUNTER: ICD-10-CM

## 2021-06-02 PROCEDURE — 99309 SBSQ NF CARE MODERATE MDM 30: CPT | Performed by: NURSE PRACTITIONER

## 2021-06-02 NOTE — PROGRESS NOTES
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)     · Stable  · Continue with 24/7 supportive care at SNF  · Follow up with neurology as recommended          Left tibial fracture     · Currently stable  · Pain well controled with robaxin q8 hours and PRN Tylenol ir ibuprofen  · Continue with nonweightbearing status  · Follow-up with orthopedics June 15th  · Continue PT OT as able  · Continue 24/7 SNF supportive care and management  · Continue with Lovenox until cleared by Orthopedics   · Continue with irom brace to LLE  · Continue to monitor          Right tibial fracture     · Currently stable  · Pain well controled with robaxin q8 hours and PRN Tylenol ir ibuprofen  · Continue with nonweightbearing status  · Follow-up with orthopedics June 15th  · Continue PT OT as able  · Continue 24/7 SNF supportive care and management  · Continue with Lovenox until cleared by Orthopedics   · Continue with knee immobilizer to RLE  · fall precautions            Other    Ambulatory dysfunction - Primary     · PT/OT following   · Jack lift OOB  · Continues to be NWB to b/l LE  · Follow up with orthopedics as recommended  · Fall precautions  · Continue to monitor               No orders of the defined types were placed in this encounter       - Counseling Documentation: patient was counseled regarding: risks and benefits of treatment options and importance of compliance with treatment    Chief Complaint     No chief complaint on file  History of Present Illness     Fidel Dixon is a 44-year-old male at [facility] for short-term rehab  He is being seen for a follow-up his chronic medical conditions    His comorbidities include muscle dystrophy, vitamin-D deficienc, and ambulatoryy dysfunction  Upon examination patient was out of bed in the wheelchair, resting comfortably  He says he has minimal pain and it is well controlled by medications  Has a good appetite is eating well  He is sleeping well at night  He denies headaches, dizziness, lightheadedness chest pain, shortness of breath, abdominal pain, and GI  upset  Per nursing no acute concerns or issues at this time  The following portions of the patient's history were reviewed and updated as appropriate: allergies, current medications, past family history, past medical history, past social history, past surgical history and problem list     Review of Systems     Review of Systems   Constitutional: Negative for activity change, appetite change, chills, fatigue and fever  HENT: Negative for congestion, ear pain and sore throat  Eyes: Negative for visual disturbance  Respiratory: Negative for cough and shortness of breath  Cardiovascular: Negative for chest pain and palpitations  Gastrointestinal: Negative for abdominal pain, constipation, diarrhea, nausea and vomiting  Genitourinary: Negative for dysuria and hematuria  Musculoskeletal: Positive for arthralgias and gait problem  Negative for back pain and joint swelling  Skin: Negative for color change and rash  Neurological: Positive for weakness  Negative for dizziness, syncope, light-headedness and headaches  Psychiatric/Behavioral: Negative for dysphoric mood, sleep disturbance and suicidal ideas         Active Problem List     Patient Active Problem List   Diagnosis    Suazo's muscular dystrophy ([facility] [address] )    Allergic rhinitis    Left tibial fracture    Right tibial fracture    Other insomnia    Ambulatory dysfunction    Depression with anxiety       Objective     /72   Pulse 88   Temp (!) 96 9 °F (36 1 °C)   Resp 18   SpO2 97%     Physical Exam  Constitutional: General: He is not in acute distress  Appearance: Normal appearance  He is normal weight  He is not ill-appearing  HENT:      Head: Normocephalic and atraumatic  Mouth/Throat:      Mouth: Mucous membranes are moist    Eyes:      General:         Right eye: No discharge  Left eye: No discharge  Cardiovascular:      Rate and Rhythm: Normal rate and regular rhythm  Pulses: Normal pulses  Heart sounds: Normal heart sounds  No murmur  Pulmonary:      Effort: Pulmonary effort is normal  No respiratory distress  Breath sounds: Normal breath sounds  No wheezing  Abdominal:      General: Bowel sounds are normal  There is no distension  Palpations: Abdomen is soft  There is no mass  Tenderness: There is no abdominal tenderness  Musculoskeletal:         General: Signs of injury present  No swelling or tenderness  Right lower leg: No edema  Left lower leg: No edema  Comments: Knee immobilizer to RLW, IROM brace to LLE   Skin:     General: Skin is warm and dry  Coloration: Skin is not jaundiced or pale  Findings: No rash  Neurological:      General: No focal deficit present  Mental Status: He is alert and oriented to person, place, and time  Mental status is at baseline  Motor: Weakness (secondary to chronic medical conditions) present  Gait: Gait abnormal (NWB to b/l LE, wheelchair OOB)  Psychiatric:         Mood and Affect: Mood normal          Behavior: Behavior normal          Thought Content: Thought content normal  Thought content does not include homicidal or suicidal ideation  Thought content does not include homicidal or suicidal plan  Pertinent Laboratory/Diagnostic Studies:  Labs reviewed in facility paper chart  Current Medications   Medications were reviewed and updated  Please refer to paper chart for updated list of medications      Ismael CAMEJO  6/2/2021 2:26 PM    Please note:  Voice-recognition software may have been used in the preparation of this document  Occasional wrong word or "sound-alike" substitutions may have occurred due to the inherent limitations of voice recognition software  Interpretation should be guided by context

## 2021-06-02 NOTE — ASSESSMENT & PLAN NOTE
· Currently stable  · Pain well controled with robaxin q8 hours and PRN Tylenol ir ibuprofen  · Continue with nonweightbearing status  · Follow-up with orthopedics June 15th  · Continue PT OT as able  · Continue 24/7 SNF supportive care and management  · Continue with Lovenox until cleared by Orthopedics   · Continue with irom brace to LLE  · Continue to monitor

## 2021-06-02 NOTE — ASSESSMENT & PLAN NOTE
· Currently stable  · Pain well controled with robaxin q8 hours and PRN Tylenol ir ibuprofen  · Continue with nonweightbearing status  · Follow-up with orthopedics June 15th  · Continue PT OT as able  · Continue 24/7 SNF supportive care and management  · Continue with Lovenox until cleared by Orthopedics   · Continue with knee immobilizer to RLE  · fall precautions

## 2021-06-02 NOTE — ASSESSMENT & PLAN NOTE
· PT/OT following   · Jack lift OOB  · Continues to be NWB to b/l LE  · Follow up with orthopedics as recommended  · Fall precautions  · Continue to monitor

## 2021-06-09 ENCOUNTER — NURSING HOME VISIT (OUTPATIENT)
Dept: GERIATRICS | Facility: OTHER | Age: 51
End: 2021-06-09
Payer: MEDICARE

## 2021-06-09 VITALS
DIASTOLIC BLOOD PRESSURE: 60 MMHG | HEART RATE: 75 BPM | SYSTOLIC BLOOD PRESSURE: 104 MMHG | OXYGEN SATURATION: 95 % | RESPIRATION RATE: 18 BRPM | TEMPERATURE: 97.5 F

## 2021-06-09 DIAGNOSIS — G47.09 OTHER INSOMNIA: Primary | ICD-10-CM

## 2021-06-09 DIAGNOSIS — K59.00 CONSTIPATION, UNSPECIFIED CONSTIPATION TYPE: ICD-10-CM

## 2021-06-09 PROCEDURE — 99307 SBSQ NF CARE SF MDM 10: CPT | Performed by: NURSE PRACTITIONER

## 2021-06-09 NOTE — ASSESSMENT & PLAN NOTE
· Sleeping well with no complaints  · Wishes to be weaned of mirtazapine  · Will decrease mirtazapine to 7 5 mg q h s    · Will continue to follow and discontinue as appropriate  · Continue with melatonin daily q h s   · Maintain sleep-wake cycle  · Avoid napping throughout the day and drinking caffeine in the afternoon  · Continue to monitor and adjust medications as appropriate

## 2021-06-09 NOTE — ASSESSMENT & PLAN NOTE
· Complaints of having too frequent bowel movements  · Sometimes they are loose  · Will change schedule senna to p r n  · Will continue with p r n   MiraLax a  · Encourage p o  hydration

## 2021-06-09 NOTE — PROGRESS NOTES
[facility]  [address]  [phone]  Senior Care SNF List: [facility]   Code 32      NAME: Nic Tracey  AGE: 45 y o  SEX: male    DATE OF ENCOUNTER: 6/9/2021    Assessment and Plan     Problem List Items Addressed This Visit        Other    Other insomnia - Primary     · Sleeping well with no complaints  · Wishes to be weaned of mirtazapine  · Will decrease mirtazapine to 7 5 mg q h s  · Will continue to follow and discontinue as appropriate  · Continue with melatonin daily q h s   · Maintain sleep-wake cycle  · Avoid napping throughout the day and drinking caffeine in the afternoon  · Continue to monitor and adjust medications as appropriate         Constipation     · Complaints of having too frequent bowel movements  · Sometimes they are loose  · Will change schedule senna to p r n  · Will continue with p r n  MiraLax a  · Encourage p o  hydration               No orders of the defined types were placed in this encounter       - Counseling Documentation: patient was counseled regarding: risks and benefits of treatment options and importance of compliance with treatment    Chief Complaint     No chief complaint on file  History of Present Illness     Nic Tracey is a 45year old male at [facility] for long term care  He is being seen today per patient request for loose stools and questions about his medications  His comorbidities include muscle dystrophy, insomnia, and ambulatory dysfunction  Upon examination patient out of bed comfortably  He was concerned that he was having 2-3 bowel movements a day and wishes not to take Senna daily  Explained that he could have senna p r n  and MiraLax p r n  He is also concerned about being on both melatonin and mirtazapine for sleep  He does not wish to be on mirtazapine anymore  Explained to him that I would cut the medication in half and then wean him off as able to    He was agreeable to that plan of care  He denied headaches, dizziness, lightheadedness, vision change, chest pain, shortness breath, cough, abdominal pain, and GI  upset  Per nursing no other acute concerns or issues at this time  The following portions of the patient's history were reviewed and updated as appropriate: allergies, current medications, past family history, past medical history, past social history, past surgical history and problem list     Review of Systems     Review of Systems   Constitutional: Negative for activity change, appetite change, chills, fatigue and fever  HENT: Negative for congestion, ear pain and sore throat  Eyes: Negative for visual disturbance  Respiratory: Negative for cough and shortness of breath  Cardiovascular: Negative for chest pain and palpitations  Gastrointestinal: Negative for abdominal pain, constipation, diarrhea, nausea and vomiting  To frequent stools, ocasionally loose   Genitourinary: Negative for dysuria and hematuria  Musculoskeletal: Positive for arthralgias and gait problem  Negative for back pain and joint swelling  Skin: Negative for color change and rash  Neurological: Positive for weakness  Negative for dizziness, syncope, light-headedness and headaches  Psychiatric/Behavioral: Negative for dysphoric mood, sleep disturbance and suicidal ideas  Active Problem List     Patient Active Problem List   Diagnosis    Suazo's muscular dystrophy ([facility] [address] )    Allergic rhinitis    Left tibial fracture    Right tibial fracture    Other insomnia    Ambulatory dysfunction    Depression with anxiety    Constipation       Objective     /60   Pulse 75   Temp 97 5 °F (36 4 °C)   Resp 18   SpO2 95%     Physical Exam  Constitutional:       General: He is not in acute distress  Appearance: Normal appearance  He is normal weight  He is not ill-appearing  HENT:      Head: Normocephalic and atraumatic     Cardiovascular:      Rate and Rhythm: Normal rate and regular rhythm  Pulses: Normal pulses  Heart sounds: Normal heart sounds  No murmur  Pulmonary:      Effort: Pulmonary effort is normal  No respiratory distress  Breath sounds: Normal breath sounds  No wheezing  Abdominal:      General: Bowel sounds are normal  There is no distension  Palpations: Abdomen is soft  There is no mass  Tenderness: There is no abdominal tenderness  Musculoskeletal:         General: Signs of injury present  No tenderness  Right lower leg: No edema  Left lower leg: No edema  Comments: Knee immobilizer to RLW, IROM brace to LLE   Skin:     General: Skin is warm and dry  Coloration: Skin is not jaundiced or pale  Findings: No rash  Neurological:      General: No focal deficit present  Mental Status: He is alert and oriented to person, place, and time  Mental status is at baseline  Motor: Weakness (secondary to chronic medical conditions) present  Gait: Gait abnormal (NWB to b/l LE, wheelchair OOB)  Psychiatric:         Mood and Affect: Mood normal          Behavior: Behavior normal          Thought Content: Thought content normal  Thought content does not include homicidal or suicidal ideation  Thought content does not include homicidal or suicidal plan  Pertinent Laboratory/Diagnostic Studies:  Labs reviewed in facility paper chart  Current Medications   Medications were reviewed and updated  Please refer to paper chart for updated list of medications  Erlinda CAMEJO  6/9/2021 4:31 PM    Please note:  Voice-recognition software may have been used in the preparation of this document  Occasional wrong word or "sound-alike" substitutions may have occurred due to the inherent limitations of voice recognition software  Interpretation should be guided by context

## 2021-06-17 ENCOUNTER — NURSING HOME VISIT (OUTPATIENT)
Dept: GERIATRICS | Facility: OTHER | Age: 51
End: 2021-06-17
Payer: MEDICARE

## 2021-06-17 DIAGNOSIS — S82.101P CLOSED FRACTURE OF PROXIMAL END OF RIGHT TIBIA WITH MALUNION, UNSPECIFIED FRACTURE MORPHOLOGY, SUBSEQUENT ENCOUNTER: Primary | ICD-10-CM

## 2021-06-17 DIAGNOSIS — F41.8 DEPRESSION WITH ANXIETY: ICD-10-CM

## 2021-06-17 DIAGNOSIS — G71.01 BECKER'S MUSCULAR DYSTROPHY (HCC): Chronic | ICD-10-CM

## 2021-06-17 DIAGNOSIS — J30.1 ALLERGIC RHINITIS DUE TO POLLEN, UNSPECIFIED SEASONALITY: ICD-10-CM

## 2021-06-17 DIAGNOSIS — S82.102D CLOSED FRACTURE OF PROXIMAL END OF LEFT TIBIA WITH ROUTINE HEALING, UNSPECIFIED FRACTURE MORPHOLOGY, SUBSEQUENT ENCOUNTER: ICD-10-CM

## 2021-06-17 PROCEDURE — 99316 NF DSCHRG MGMT 30 MIN+: CPT | Performed by: NURSE PRACTITIONER

## 2021-06-17 RX ORDER — METHOCARBAMOL 500 MG/1
500 TABLET, FILM COATED ORAL EVERY 8 HOURS
COMMUNITY
End: 2021-06-25 | Stop reason: SDUPTHER

## 2021-06-17 RX ORDER — FLUTICASONE PROPIONATE 50 MCG
2 SPRAY, SUSPENSION (ML) NASAL 2 TIMES DAILY
COMMUNITY
End: 2022-05-26 | Stop reason: SDUPTHER

## 2021-06-17 RX ORDER — MULTIVIT-MIN/IRON/FOLIC ACID/K 18-600-40
1 CAPSULE ORAL DAILY
COMMUNITY

## 2021-06-17 NOTE — PROGRESS NOTES
[facility]  [address]  [phone]  [address] [place] [address] St: [facility]  POS: 31: SNF/Short Term Rehab      NAME: Tatiana Neff  AGE: 45 y o  SEX: male  DATE OF ADMISSION: MAY 13, 2021  DATE OF DISCHARGE: June 18, 2021  DISCHARGE DISPOSITION: TO HOME    Reason for admission: Patient was admitted from [facility] for rehabilitation after hospitalization for ambulatory dysfunction and deconditioning  This is a 50-year-old male patient currently admitted at [facility] (5/13/2021 to present) following discharge from acute care hospitalization ([facility]) with Dx of Closed Fracture of Proximal end of Left tibia and Closed fracture of Proximal end of Right tibia with malunion  Admission Diagnoses: Closed Fracture of Proximal end of Left tibia and Closed fracture of Proximal end of Right tibia with malunion  Discharge Diagnoses:     * Ambulatory dysfunction and deconditioning  * Closed Fracture of Proximal end of Left tibia  * Closed fracture of Proximal end of Right tibia with malunion  * Suazo's Dystrophy  * Depression and Anxiety  * Allergic Rhinits    Course of stay: Patient was admitted to [facility] for rehabilitation due to ambulatory dysfunction and deconditioning  Patient received 24/7 SNF supportive care, PT/OT/ST services  To date, patient stay in [facility] has been uneventful and showed progressive improvement in strengthening and function with therapies  Per , patient is scheduled for discharge to home with family on June 18, 2021 - declined VNA and PT/OT services - patient interested to apply for the [address] rehabilitation upon discharge  PT Progress Note: Per PT/ OT progress note, patient is:    * Contact guard assistance with 2 assist without or without sliding board for transfers    * Non ambulatory    * Supervision for bed mobility and completion of ADLs'      Labs and testing performed during stay: Most recent labs as below:    * COVID -19 test (5/25/2021) = Not detected    * CBC without diff (6/4/2021) = WNL    * CMP (6/4/2021) = WNL except:  Crea: 0 32 (L)  Alk  Phosph: 124 (H)  Alb: 3 3 (L)  ALT: 81 (H) <= 84 (5/28/2021)    Discharge Medications: See discharge medication list which was reviewed and signed  Status at time of discharge: Stable    Today's Visit: 6/17/202112:01 PM    Subjective:" I' m pretty good"  Vitals:T97 3F -P80 -R17 Bp: 122/69 SpO2: 97% RA  Weight: 157 0 lbs (6/1/2021) <= 158 9 lbs (5/26/2021)    Review of Systems   Constitutional: Negative  HENT: Negative  Eyes: Negative  Respiratory: Negative  Cardiovascular: Negative  Gastrointestinal: Negative  Endocrine: Negative  Genitourinary: Negative  Musculoskeletal: Negative  Skin: Negative  Allergic/Immunologic: Negative  Neurological: Negative  Hematological: Negative  Psychiatric/Behavioral: Negative  All other systems reviewed and are negative  Exam: Physical Exam  Vitals and nursing note reviewed  Constitutional:       General: He is not in acute distress  Appearance: Normal appearance  He is normal weight  He is not ill-appearing, toxic-appearing or diaphoretic  HENT:      Head: Normocephalic and atraumatic  Right Ear: Tympanic membrane, ear canal and external ear normal  There is no impacted cerumen  Left Ear: Tympanic membrane, ear canal and external ear normal  There is no impacted cerumen  Nose: Nose normal  No congestion or rhinorrhea  Mouth/Throat:      Mouth: Mucous membranes are moist       Pharynx: Oropharynx is clear  No oropharyngeal exudate or posterior oropharyngeal erythema  Eyes:      General: No scleral icterus  Right eye: No discharge  Left eye: No discharge  Extraocular Movements: Extraocular movements intact  Conjunctiva/sclera: Conjunctivae normal       Pupils: Pupils are equal, round, and reactive to light  Neck:      Vascular: No carotid bruit  Cardiovascular:      Rate and Rhythm: Normal rate and regular rhythm  Heart sounds: Normal heart sounds  No murmur heard  No friction rub  No gallop  Pulmonary:      Effort: Pulmonary effort is normal  No respiratory distress  Breath sounds: Normal breath sounds  No stridor  No wheezing, rhonchi or rales  Chest:      Chest wall: No tenderness  Abdominal:      General: Bowel sounds are normal  There is no distension  Palpations: Abdomen is soft  There is no mass  Tenderness: There is no abdominal tenderness  There is no right CVA tenderness, left CVA tenderness, guarding or rebound  Hernia: No hernia is present  Genitourinary:     Comments: Non distended bladder  Continent  Musculoskeletal:         General: No swelling, tenderness, deformity or signs of injury  Cervical back: Normal range of motion and neck supple  No rigidity or tenderness  Right lower leg: No edema  Left lower leg: No edema  Comments: Non ambulatory due to muscular dystrophy - wheel chair bound  Patient continue to wear the hinged knee immobilizer by preference  Lymphadenopathy:      Cervical: No cervical adenopathy  Skin:     General: Skin is warm  Capillary Refill: Capillary refill takes less than 2 seconds  Coloration: Skin is not jaundiced or pale  Findings: No bruising, erythema, lesion or rash  Comments: Skin intact  Neurological:      Mental Status: He is alert and oriented to person, place, and time  Mental status is at baseline  Comments: Verbal with clear coherent speech - oriented to name/birthday/date/place  Psychiatric:         Mood and Affect: Mood normal          Behavior: Behavior normal          Thought Content: Thought content normal       Comments: Calm, cooperative, pleasant   PHQ2: Negative     Discussion with patient/family and further instructions:  -Fall precautions  -Aspiration precautions  -Bleeding precautions  -Monitor for signs/symptoms of infection  -Medication list was reviewed and signed  -DME form deemed not needed at this time    Follow-up Recommendations:     * Please follow-up with your primary care physician within 7-10 days of discharge to review medication changes and current status  The facility has set-up an appointment for your JOSSIE WEEMS with your PCP (Dr Naveed Ferguson: [phone]/ fax: [phone]) on June 25, 2021 at 3:15PM)  Please make every effort to attend this appointment  Should there be a change in your medical condition and an earlier appointment is needed please call the number provided above  * Recommend PT/OT as an out-patient service to continue strengthening, gait, balance and overall functional independence improvement gained during in-patient rehabilitation  * Recommend VNA to assist in medication management and other skilled nursing needs during transition to home  Problem List Follow-up Recommendations:    Right tibial fracture  Last seen by Orthopedic office on 6/15/2021  = Deemed healed per imaging result  = WBAT to BLE  = May discontinue use of hinged knee immobilizer   = Follow-up as needed only  Patient denies any pain  Completed Lovenox 30mg BID  Fall precaution      Left tibial fracture  Please see management in setting of #1  Orthopedic office discontinued use of IROM brace to LLE     Suazo's muscular dystrophy ([facility] [address] )  Stable  Non ambulatory - wheelchair bound  Continue Robaxin 500mg Q8 hours  Continue Vit D 2000 units daily  To follow-up PCP upon discharge  Depression with anxiety  PHQ2: Negative  Patient does not want to take Mirtazapine or Melatonin anymore  Dose titrated down from 15mg at HS to 7 5mg since 6/11/2021  Will discontinue today 6/17/2021  To follow-up PCP upon discharge      Allergic Rhinitis  Continue Flonase nasal spray BID  Follow-up with PCP upon discharge      Discharge Statement:    * Spent more than 30 minutes discharging the patient; greater than 50% spent on physical examination of patient, answering questions, discussion of plan of care, recommendations and providing post discharge instructions  Additional time spend on other discharge related activities including documentation        BASHIR Troy  6/17/202112:01 PM

## 2021-06-17 NOTE — LETTER
June 17, 2021     Yaron Mcdonald MD  [address]  [address]  [facility]    Patient: Rebecca Funez   YOB: 1970   Date of Visit: 6/17/2021       Dear Dr Ramirez Miss:    Dorian Colon PM! Sending to you the discharge note for  Mr  Rebecca Funez  He scheduled for discharge back to home on June 18, 2021 after completing short term rehabilitation at [facility]  He is scheduled for his transition of care visit with you on June 25, 2021 at 3:15PM     If you have questions, please do not hesitate to call me  Sincerely,      BASHIR Burnett        CC: No Recipients  Lisa Braswell, [address]  6/17/2021 12:40 PM  Cosign Needed  [facility]  [address]  [phone]  [facility]: [facility]  POS: 31: SNF/Short Term Rehab      NAME: Rebecca Funez  AGE: 45 y o  SEX: male  DATE OF ADMISSION: MAY 13, 2021  DATE OF DISCHARGE: June 18, 2021  DISCHARGE DISPOSITION: TO HOME    Reason for admission: Patient was admitted from [facility] for rehabilitation after hospitalization for ambulatory dysfunction and deconditioning  This is a 56-year-old male patient currently admitted at [facility] (5/13/2021 to present) following discharge from acute care hospitalization ([facility]) with Dx of Closed Fracture of Proximal end of Left tibia and Closed fracture of Proximal end of Right tibia with malunion  Admission Diagnoses: Closed Fracture of Proximal end of Left tibia and Closed fracture of Proximal end of Right tibia with malunion  Discharge Diagnoses:     * Ambulatory dysfunction and deconditioning  * Closed Fracture of Proximal end of Left tibia  * Closed fracture of Proximal end of Right tibia with malunion  * Suazo's Dystrophy  * Depression and Anxiety  * Allergic Rhinits    Course of stay: Patient was admitted to [facility] for rehabilitation due to ambulatory dysfunction and deconditioning  Patient received 24/7 SNF supportive care, PT/OT/ST services   To date, patient stay in STR has been uneventful and showed progressive improvement in strengthening and function with therapies  Per SW, patient is scheduled for discharge to home with family on June 18, 2021 - declined VNA and PT/OT services - patient interested to apply for the [address] rehabilitation upon discharge  PT Progress Note: Per PT/ OT progress note, patient is:    * Contact guard assistance with 2 assist without or without sliding board for transfers    * Non ambulatory    * Supervision for bed mobility and completion of ADLs'  Labs and testing performed during stay: Most recent labs as below:    * COVID -19 test (5/25/2021) = Not detected    * CBC without diff (6/4/2021) = WNL    * CMP (6/4/2021) = WNL except:  Crea: 0 32 (L)  Alk  Phosph: 124 (H)  Alb: 3 3 (L)  ALT: 81 (H) <= 84 (5/28/2021)    Discharge Medications: See discharge medication list which was reviewed and signed  Status at time of discharge: Stable    Today's Visit: 6/17/202112:01 PM    Subjective:" I' m pretty good"  Vitals:T97 3F -P80 -R17 Bp: 122/69 SpO2: 97% RA  Weight: 157 0 lbs (6/1/2021) <= 158 9 lbs (5/26/2021)    Review of Systems   Constitutional: Negative  HENT: Negative  Eyes: Negative  Respiratory: Negative  Cardiovascular: Negative  Gastrointestinal: Negative  Endocrine: Negative  Genitourinary: Negative  Musculoskeletal: Negative  Skin: Negative  Allergic/Immunologic: Negative  Neurological: Negative  Hematological: Negative  Psychiatric/Behavioral: Negative  All other systems reviewed and are negative  Exam: Physical Exam  Vitals and nursing note reviewed  Constitutional:       General: He is not in acute distress  Appearance: Normal appearance  He is normal weight  He is not ill-appearing, toxic-appearing or diaphoretic  HENT:      Head: Normocephalic and atraumatic        Right Ear: Tympanic membrane, ear canal and external ear normal  There is no impacted cerumen  Left Ear: Tympanic membrane, ear canal and external ear normal  There is no impacted cerumen  Nose: Nose normal  No congestion or rhinorrhea  Mouth/Throat:      Mouth: Mucous membranes are moist       Pharynx: Oropharynx is clear  No oropharyngeal exudate or posterior oropharyngeal erythema  Eyes:      General: No scleral icterus  Right eye: No discharge  Left eye: No discharge  Extraocular Movements: Extraocular movements intact  Conjunctiva/sclera: Conjunctivae normal       Pupils: Pupils are equal, round, and reactive to light  Neck:      Vascular: No carotid bruit  Cardiovascular:      Rate and Rhythm: Normal rate and regular rhythm  Heart sounds: Normal heart sounds  No murmur heard  No friction rub  No gallop  Pulmonary:      Effort: Pulmonary effort is normal  No respiratory distress  Breath sounds: Normal breath sounds  No stridor  No wheezing, rhonchi or rales  Chest:      Chest wall: No tenderness  Abdominal:      General: Bowel sounds are normal  There is no distension  Palpations: Abdomen is soft  There is no mass  Tenderness: There is no abdominal tenderness  There is no right CVA tenderness, left CVA tenderness, guarding or rebound  Hernia: No hernia is present  Genitourinary:     Comments: Non distended bladder  Continent  Musculoskeletal:         General: No swelling, tenderness, deformity or signs of injury  Cervical back: Normal range of motion and neck supple  No rigidity or tenderness  Right lower leg: No edema  Left lower leg: No edema  Comments: Non ambulatory due to muscular dystrophy - wheel chair bound  Patient continue to wear the hinged knee immobilizer by preference  Lymphadenopathy:      Cervical: No cervical adenopathy  Skin:     General: Skin is warm  Capillary Refill: Capillary refill takes less than 2 seconds        Coloration: Skin is not jaundiced or pale       Findings: No bruising, erythema, lesion or rash  Comments: Skin intact  Neurological:      Mental Status: He is alert and oriented to person, place, and time  Mental status is at baseline  Comments: Verbal with clear coherent speech - oriented to name/birthday/date/place  Psychiatric:         Mood and Affect: Mood normal          Behavior: Behavior normal          Thought Content: Thought content normal       Comments: Calm, cooperative, pleasant  PHQ2: Negative     Discussion with patient/family and further instructions:  -Fall precautions  -Aspiration precautions  -Bleeding precautions  -Monitor for signs/symptoms of infection  -Medication list was reviewed and signed  -DME form deemed not needed at this time    Follow-up Recommendations:     * Please follow-up with your primary care physician within 7-10 days of discharge to review medication changes and current status  The facility has set-up an appointment for your JOSSIE WEEMS with your PCP (Dr Hui Duenas: [phone]/ fax: [phone]) on June 25, 2021 at 3:15PM)  Please make every effort to attend this appointment  Should there be a change in your medical condition and an earlier appointment is needed please call the number provided above  * Recommend PT/OT as an out-patient service to continue strengthening, gait, balance and overall functional independence improvement gained during in-patient rehabilitation  * Recommend VNA to assist in medication management and other skilled nursing needs during transition to home  Problem List Follow-up Recommendations:    Right tibial fracture  Last seen by Orthopedic office on 6/15/2021  = Deemed healed per imaging result  = WBAT to BLE  = May discontinue use of hinged knee immobilizer   = Follow-up as needed only  Patient denies any pain    Completed Lovenox 30mg BID  Fall precaution      Left tibial fracture  Please see management in setting of #1  Orthopedic office discontinued use of LACI santiago to LLE     Suazo's muscular dystrophy ([facility] [address] )  Stable  Non ambulatory - wheelchair bound  Continue Robaxin 500mg Q8 hours  Continue Vit D 2000 units daily  To follow-up PCP upon discharge  Depression with anxiety  PHQ2: Negative  Patient does not want to take Mirtazapine or Melatonin anymore  Dose titrated down from 15mg at HS to 7 5mg since 6/11/2021  Will discontinue today 6/17/2021  To follow-up PCP upon discharge  Allergic Rhinitis  Continue Flonase nasal spray BID  Follow-up with PCP upon discharge      Discharge Statement:    * Spent more than 30 minutes discharging the patient; greater than 50% spent on physical examination of patient, answering questions, discussion of plan of care, recommendations and providing post discharge instructions  Additional time spend on other discharge related activities including documentation        BASHIR Bernal  6/17/202112:01 PM

## 2021-06-23 ENCOUNTER — TELEPHONE (OUTPATIENT)
Dept: FAMILY MEDICINE CLINIC | Facility: CLINIC | Age: 51
End: 2021-06-23

## 2021-06-25 ENCOUNTER — OFFICE VISIT (OUTPATIENT)
Dept: FAMILY MEDICINE CLINIC | Facility: CLINIC | Age: 51
End: 2021-06-25
Payer: MEDICARE

## 2021-06-25 VITALS — SYSTOLIC BLOOD PRESSURE: 138 MMHG | DIASTOLIC BLOOD PRESSURE: 92 MMHG | HEART RATE: 80 BPM | OXYGEN SATURATION: 97 %

## 2021-06-25 DIAGNOSIS — S82.102D CLOSED FRACTURE OF PROXIMAL END OF LEFT TIBIA WITH ROUTINE HEALING, UNSPECIFIED FRACTURE MORPHOLOGY, SUBSEQUENT ENCOUNTER: ICD-10-CM

## 2021-06-25 DIAGNOSIS — G47.00 INSOMNIA, UNSPECIFIED TYPE: ICD-10-CM

## 2021-06-25 DIAGNOSIS — G71.01 BECKER'S MUSCULAR DYSTROPHY (HCC): Primary | Chronic | ICD-10-CM

## 2021-06-25 DIAGNOSIS — R60.9 DEPENDENT EDEMA: ICD-10-CM

## 2021-06-25 DIAGNOSIS — S82.101P CLOSED FRACTURE OF PROXIMAL END OF RIGHT TIBIA WITH MALUNION, UNSPECIFIED FRACTURE MORPHOLOGY, SUBSEQUENT ENCOUNTER: ICD-10-CM

## 2021-06-25 PROBLEM — I42.9 FAMILIAL CARDIOMYOPATHY (HCC): Status: ACTIVE | Noted: 2021-06-25

## 2021-06-25 PROCEDURE — 99214 OFFICE O/P EST MOD 30 MIN: CPT | Performed by: INTERNAL MEDICINE

## 2021-06-25 RX ORDER — METHOCARBAMOL 500 MG/1
500 TABLET, FILM COATED ORAL 2 TIMES DAILY
Qty: 30 TABLET | Refills: 0 | Status: SHIPPED | OUTPATIENT
Start: 2021-06-25 | End: 2022-06-01 | Stop reason: CLARIF

## 2021-06-25 RX ORDER — LANOLIN ALCOHOL/MO/W.PET/CERES
6 CREAM (GRAM) TOPICAL
COMMUNITY
End: 2021-06-25 | Stop reason: SDUPTHER

## 2021-06-25 RX ORDER — PHENOL 1.4 %
10 AEROSOL, SPRAY (ML) MUCOUS MEMBRANE
Qty: 30 TABLET | Refills: 0 | Status: SHIPPED | OUTPATIENT
Start: 2021-06-25 | End: 2022-06-01 | Stop reason: CLARIF

## 2021-06-25 NOTE — PROGRESS NOTES
Assessment/Plan:         Diagnoses and all orders for this visit:    Suazo's muscular dystrophy ([facility] [address] )   encourage patient to cut back on muscle relaxer slowly  Patient will follow-up with his physiatrist at [address] fracture of proximal end of left tibia with routine healing, unspecified fracture morphology, subsequent encounter  -     methocarbamol (ROBAXIN) 500 mg tablet; Take 1 tablet (500 mg total) by mouth 2 (two) times a day    Closed fracture of proximal end of right tibia with malunion, unspecified fracture morphology, subsequent encounter  -     methocarbamol (ROBAXIN) 500 mg tablet; Take 1 tablet (500 mg total) by mouth 2 (two) times a day        Insomnia, unspecified type  -     melatonin 10 MG TABS; Take 1 tablet (10 mg total) by mouth daily at bedtime    Dependent edema   compression stocking  Other orders  -     Discontinue: melatonin 3 mg; Take 6 mg by mouth  -     Cancel: Cologuard; Future        Subjective:      Patient ID: Hans Hartman is a 45 y o  male  HPI  TCM Call (since 5/25/2021)     None      TCM Call (since 5/25/2021)     None       patient was recently  Admitted to [facility] for rehab for closed fracture of proximal left tibia and proximal right tibia with malunion status post level 2 weeks of close treatment of the tibial fractures  at [facility] was D seat  Patient has history of muscular dystrophy manage by [facility] physiatry department and has been nonambulatory from was 15 years  Patient is now at home with his  parentsappearance  A sister who lives close by  also helps out  He has been working at [employer] in [employer]  The following portions of the patient's history were reviewed and updated as appropriate: allergies, current medications, past family history, past medical history, past social history, past surgical history and problem list     Review of Systems   Constitutional: Negative for chills and fever  Respiratory: Negative for cough and shortness of breath  Cardiovascular: Positive for leg swelling  Negative for chest pain and palpitations  Gastrointestinal: Positive for diarrhea (Two loose stools for the last several days  He stop taking supplement that it recently started  He will update me early next week if his symptoms are not getting better  )  Musculoskeletal:        As above   Neurological: Negative for dizziness  Objective:      /92 (BP Location: Left arm, Patient Position: Sitting, Cuff Size: Standard)   Pulse 80   SpO2 97%          Physical Exam  Cardiovascular:      Rate and Rhythm: Normal rate and regular rhythm  Heart sounds: Normal heart sounds  No murmur heard  Pulmonary:      Effort: Pulmonary effort is normal  No respiratory distress  Breath sounds: Normal breath sounds  No wheezing or rales  Abdominal:      General: There is no distension  Palpations: There is no mass  Tenderness: There is no abdominal tenderness  There is no guarding  Musculoskeletal:         General: No tenderness (Negative calf tenderness)  Right lower leg: Edema present  Left lower leg: Edema present  Neurological:      Mental Status: He is alert  BMI Counseling: There is no height or weight on file to calculate BMI  Follow-up plan was not completed due to elderly patient (71 years old) where weight reduction/weight gain would complicate underlying health condition such as: illness or physical disability

## 2021-06-29 ENCOUNTER — TELEPHONE (OUTPATIENT)
Dept: FAMILY MEDICINE CLINIC | Facility: CLINIC | Age: 51
End: 2021-06-29

## 2021-07-13 ENCOUNTER — TELEPHONE (OUTPATIENT)
Dept: FAMILY MEDICINE CLINIC | Facility: CLINIC | Age: 51
End: 2021-07-13

## 2021-07-13 DIAGNOSIS — R39.198 DIFFICULTY IN URINATION: ICD-10-CM

## 2021-07-13 DIAGNOSIS — G71.01 BECKER'S MUSCULAR DYSTROPHY (HCC): Primary | ICD-10-CM

## 2021-07-13 NOTE — TELEPHONE ENCOUNTER
Patient called and stated that he doesn't need a referral for a Urologist  He is having an issue with constipation  He said that he thinks it should be for a GI doctor instead    Please advise

## 2021-07-13 NOTE — TELEPHONE ENCOUNTER
Pt called in states that he was seen with Maria Victoria Solitario in June for urinary retention  Pt states that he is still having the issue and wants to know what Dr Aleman suggest the next steps would be? Pt is scheduled to see Ramonjonathandalton Roman in August for 1 month follow up  Pt wanted this ran by Maria Victoria Solitario please advise thank you

## 2021-08-24 ENCOUNTER — TELEPHONE (OUTPATIENT)
Dept: FAMILY MEDICINE CLINIC | Facility: CLINIC | Age: 51
End: 2021-08-24

## 2021-08-24 NOTE — TELEPHONE ENCOUNTER
Patient called requesting to speak to Dr Becka Rick regarding an incident that took place in the office when he came in for his appt  He was extremely upset about how things were handle   Please call him at [phone]

## 2021-08-24 NOTE — TELEPHONE ENCOUNTER
Returned pt call  He was very apologetic  He did not sign in on arrival   Advised pt to apologize to staff for his outburst and he agreed to do so  He needed advice on how to care for swelling in ankles since he has returned to work and legs down  Azaliaaykira Dyean He will wear compression stockings 20mm and elevate legs as much as possible  Needs to restrict salt better  Explained    If leg swelling stabilizes and controllable he can come back at later time or by virtual appt which may be easier for him

## 2022-01-10 ENCOUNTER — APPOINTMENT (OUTPATIENT)
Dept: LAB | Age: 52
End: 2022-01-10
Payer: MEDICARE

## 2022-01-10 DIAGNOSIS — D64.9 ANEMIA, UNSPECIFIED TYPE: ICD-10-CM

## 2022-01-10 DIAGNOSIS — N18.9 CHRONIC KIDNEY DISEASE, UNSPECIFIED CKD STAGE: ICD-10-CM

## 2022-01-10 DIAGNOSIS — R53.1 ASTHENIA: ICD-10-CM

## 2022-01-10 LAB
ALBUMIN SERPL BCP-MCNC: 3.9 G/DL (ref 3.5–5)
ALP SERPL-CCNC: 106 U/L (ref 46–116)
ALT SERPL W P-5'-P-CCNC: 44 U/L (ref 12–78)
ANION GAP SERPL CALCULATED.3IONS-SCNC: 3 MMOL/L (ref 4–13)
AST SERPL W P-5'-P-CCNC: 30 U/L (ref 5–45)
BILIRUB SERPL-MCNC: 0.7 MG/DL (ref 0.2–1)
BUN SERPL-MCNC: 16 MG/DL (ref 5–25)
CALCIUM SERPL-MCNC: 9.6 MG/DL (ref 8.3–10.1)
CHLORIDE SERPL-SCNC: 107 MMOL/L (ref 100–108)
CK MB SERPL-MCNC: 13.4 NG/ML (ref 0–5)
CK MB SERPL-MCNC: 2.2 % (ref 0–2.5)
CK SERPL-CCNC: 622 U/L (ref 39–308)
CO2 SERPL-SCNC: 28 MMOL/L (ref 21–32)
CREAT SERPL-MCNC: 0.38 MG/DL (ref 0.6–1.3)
ERYTHROCYTE [DISTWIDTH] IN BLOOD BY AUTOMATED COUNT: 12.2 % (ref 11.6–15.1)
GFR SERPL CREATININE-BSD FRML MDRD: 139 ML/MIN/1.73SQ M
GLUCOSE P FAST SERPL-MCNC: 94 MG/DL (ref 65–99)
HCT VFR BLD AUTO: 45.7 % (ref 36.5–49.3)
HGB BLD-MCNC: 15.4 G/DL (ref 12–17)
MCH RBC QN AUTO: 31.3 PG (ref 26.8–34.3)
MCHC RBC AUTO-ENTMCNC: 33.7 G/DL (ref 31.4–37.4)
MCV RBC AUTO: 93 FL (ref 82–98)
PLATELET # BLD AUTO: 241 THOUSANDS/UL (ref 149–390)
PMV BLD AUTO: 9.7 FL (ref 8.9–12.7)
POTASSIUM SERPL-SCNC: 4.2 MMOL/L (ref 3.5–5.3)
PROT SERPL-MCNC: 8.2 G/DL (ref 6.4–8.2)
RBC # BLD AUTO: 4.92 MILLION/UL (ref 3.88–5.62)
SODIUM SERPL-SCNC: 138 MMOL/L (ref 136–145)
WBC # BLD AUTO: 3.46 THOUSAND/UL (ref 4.31–10.16)

## 2022-01-10 PROCEDURE — 80053 COMPREHEN METABOLIC PANEL: CPT

## 2022-01-10 PROCEDURE — 36415 COLL VENOUS BLD VENIPUNCTURE: CPT

## 2022-01-10 PROCEDURE — 82553 CREATINE MB FRACTION: CPT

## 2022-01-10 PROCEDURE — 85027 COMPLETE CBC AUTOMATED: CPT

## 2022-01-10 PROCEDURE — 82550 ASSAY OF CK (CPK): CPT

## 2022-03-09 ENCOUNTER — HOSPITAL ENCOUNTER (OUTPATIENT)
Dept: BONE DENSITY | Facility: IMAGING CENTER | Age: 52
Discharge: HOME | End: 2022-03-09
Payer: MEDICARE

## 2022-03-09 DIAGNOSIS — M81.0 SENILE OSTEOPOROSIS: ICD-10-CM

## 2022-03-09 PROCEDURE — 77080 DXA BONE DENSITY AXIAL: CPT

## 2022-04-11 ENCOUNTER — TELEPHONE (OUTPATIENT)
Dept: FAMILY MEDICINE CLINIC | Facility: CLINIC | Age: 52
End: 2022-04-11

## 2022-04-11 DIAGNOSIS — M81.0 OSTEOPOROSIS OF FEMUR WITHOUT PATHOLOGICAL FRACTURE: Primary | ICD-10-CM

## 2022-04-11 NOTE — PROGRESS NOTES
79-year-old male with history muscular dystrophy is sustained bilateral tibial plateau fractures with prolonged healing and abnormal union in on 1 side  He had a prolonged stay in rehab and nursing home  He was found to have osteoporosis at the femoral -3 0  Is seeing Dr Victor Hugo Parker for his muscular dystrophy and she ordered the DEXA scan  Patient will be referred to endocrinology for evaluation and treatment of his osteoporosis

## 2022-04-11 NOTE — TELEPHONE ENCOUNTER
Pt  Was recently diagnosed with osteoporosis, he was wondering if he should follow up with you or if there is a doctor you have in mind that he can see

## 2022-04-11 NOTE — TELEPHONE ENCOUNTER
See encounter note and referral to endocrinology for evaluation and treatment of osteoporosis which is complicated by his underlying conditions and past bilateral tibial plateau fractures    Please notify patient of the referral

## 2022-05-26 ENCOUNTER — TELEPHONE (OUTPATIENT)
Dept: FAMILY MEDICINE CLINIC | Facility: CLINIC | Age: 52
End: 2022-05-26

## 2022-05-26 ENCOUNTER — TELEMEDICINE (OUTPATIENT)
Dept: FAMILY MEDICINE CLINIC | Facility: CLINIC | Age: 52
End: 2022-05-26
Payer: MEDICARE

## 2022-05-26 DIAGNOSIS — U07.1 COVID-19: Primary | ICD-10-CM

## 2022-05-26 PROBLEM — S82.143A TIBIAL PLATEAU FRACTURE: Status: ACTIVE | Noted: 2021-04-01

## 2022-05-26 PROCEDURE — 99441 PR PHYS/QHP TELEPHONE EVALUATION 5-10 MIN: CPT | Performed by: FAMILY MEDICINE

## 2022-05-26 RX ORDER — FLUTICASONE PROPIONATE 50 MCG
2 SPRAY, SUSPENSION (ML) NASAL 2 TIMES DAILY
Qty: 18.2 ML | Refills: 1 | Status: SHIPPED | OUTPATIENT
Start: 2022-05-26

## 2022-05-26 NOTE — TELEPHONE ENCOUNTER
Pt tested positive for covid 5/24/22  C/O chest congestion  OTC not helping  Can he get an rx for this please  [facility]  He is also asking for a refill on his Flonase

## 2022-05-26 NOTE — PROGRESS NOTES
COVID-19 Outpatient Progress Note    Assessment/Plan:    Problem List Items Addressed This Visit        Other    COVID-19 - Primary    Relevant Medications    fluticasone (FLONASE) 50 mcg/act nasal spray    nirmatrelvir & ritonavir (Paxlovid) tablet therapy pack         Disposition:     I recommended continued isolation until at least 24 hours have passed since recovery defined as resolution of fever without the use of fever-reducing medications AND improvement in COVID symptoms AND 10 days have passed since onset of symptoms (or 10 days have passed since date of first positive viral diagnostic test for asymptomatic patients)  I have spent 10 minutes directly with the patient  Greater than 50% of this time was spent in counseling/coordination of care regarding: risks and benefits of treatment options, instructions for management and patient and family education  Encounter provider Kenrick Jeronimo MD    Provider located at [address] [place] [address] 264, [facility] [address] [address] [address] [phone]    Recent Visits  No visits were found meeting these conditions  Showing recent visits within past 7 days and meeting all other requirements  Today's Visits  Date Type Provider Dept   05/26/22 Telephone [facility] today's visits and meeting all other requirements  Future Appointments  No visits were found meeting these conditions  Showing future appointments within next 150 days and meeting all other requirements     This virtual check-in was done via telephone and he agrees to proceed  Patient agrees to participate in a virtual check in via telephone or video visit instead of presenting to the office to address urgent/immediate medical needs  Patient is aware this is a billable service  After connecting through Telephone, the patient was identified by name and date of birth   Brenton Kohli was informed that this was a telemedicine visit and that the exam was being conducted confidentially over secure lines  My office door was closed  No one else was in the room  Delmi Napoles acknowledged consent and understanding of privacy and security of the telemedicine visit  I informed the patient that I have reviewed his record in Epic and presented the opportunity for him to ask any questions regarding the visit today  The patient agreed to participate  It was my intent to perform this visit via video technology but the patient was not able to do a video connection so the visit was completed via audio telephone only  Verification of patient location:  Patient is located in the following state in which I hold an active license: [place]    Subjective:   Delmi Napoles is a 45 y o  male who has been screened for COVID-19  Symptom change since last report: unchanged  Patient's symptoms include nasal congestion and cough  COVID-19 vaccination status: Not vaccinated    Cristela Neal has been staying home and has isolated themselves in his home  He is taking care to not share personal items and is cleaning all surfaces that are touched often, like counters, tabletops, and doorknobs using household cleaning sprays or wipes  He is wearing a mask when he leaves his room       Lab Results   Component Value Date    [facility] Not Detected 06/29/2021     Past Medical History:   Diagnosis Date    Allergic rhinitis 6/16/2020    Suazo's muscular dystrophy ([facility] [address] )     since 9th grade     Past Surgical History:   Procedure Laterality Date    MUSCLE BIOPSY       Current Outpatient Medications   Medication Sig Dispense Refill    fluticasone (FLONASE) 50 mcg/act nasal spray 2 sprays into each nostril 2 (two) times a day 18 2 mL 1    nirmatrelvir & ritonavir (Paxlovid) tablet therapy pack Take 3 tablets by mouth 2 (two) times a day for 5 days Take 2 nirmatrelvir tablets + 1 ritonavir tablet together per dose 30 tablet 0    Cholecalciferol (Vitamin D) 50 MCG (2000 UT) CAPS Take 1 capsule by mouth daily      melatonin 10 MG TABS Take 1 tablet (10 mg total) by mouth daily at bedtime 30 tablet 0    Melatonin 5 MG TABS TAKE 2 TABLETS (10MG TOTAL) BY MOUTH AT BEDTIME      meloxicam (MOBIC) 7 5 mg tablet Take 7 5 mg by mouth daily      methocarbamol (ROBAXIN) 500 mg tablet Take 1 tablet (500 mg total) by mouth 2 (two) times a day 30 tablet 0    Nutritional Supplements (CREATINE) 750 MG CAPS Take 2 capsules by mouth daily       No current facility-administered medications for this visit  Allergies   Allergen Reactions    Anesthesia S-I-40 [Propofol] Other (See Comments)     Malignant hypothermia    Sulfa Antibiotics Other (See Comments)       Review of Systems   Constitutional: Negative  HENT: Positive for congestion and sinus pressure  Eyes: Negative  Respiratory: Positive for cough  Cardiovascular: Negative  Gastrointestinal: Negative  Endocrine: Negative  Genitourinary: Negative  Musculoskeletal: Negative  Allergic/Immunologic: Negative  Neurological: Negative  Hematological: Negative  Psychiatric/Behavioral: Negative  All other systems reviewed and are negative  Objective: There were no vitals filed for this visit  Physical Exam    VIRTUAL VISIT DISCLAIMER    [facility] verbally agrees to participate in [employer]  Pt is aware that [employer] could be limited without vital signs or the ability to perform a full hands-on physical Leo Fay understands he or the provider may request at any time to terminate the video visit and request the patient to seek care or treatment in person

## 2022-05-27 ENCOUNTER — TELEPHONE (OUTPATIENT)
Dept: FAMILY MEDICINE CLINIC | Facility: CLINIC | Age: 52
End: 2022-05-27

## 2022-05-27 DIAGNOSIS — U07.1 COVID-19: Primary | ICD-10-CM

## 2022-05-27 RX ORDER — FLUTICASONE PROPIONATE AND SALMETEROL XINAFOATE 230; 21 UG/1; UG/1
2 AEROSOL, METERED RESPIRATORY (INHALATION) 2 TIMES DAILY
Qty: 12 G | Refills: 1 | Status: SHIPPED | OUTPATIENT
Start: 2022-05-27

## 2022-05-27 NOTE — TELEPHONE ENCOUNTER
Patient called into office stated he was prescribed Rx: Paxlovid on 5/26/22 and is hesitant to take it due to seeing the news about "you can relapse on it"  Patient requesting [facility] be [facility]      Please review  Thank you

## 2022-06-01 ENCOUNTER — CONSULT (OUTPATIENT)
Dept: ENDOCRINOLOGY | Facility: CLINIC | Age: 52
End: 2022-06-01
Payer: MEDICARE

## 2022-06-01 ENCOUNTER — TELEPHONE (OUTPATIENT)
Dept: FAMILY MEDICINE CLINIC | Facility: CLINIC | Age: 52
End: 2022-06-01

## 2022-06-01 VITALS — OXYGEN SATURATION: 97 % | DIASTOLIC BLOOD PRESSURE: 72 MMHG | SYSTOLIC BLOOD PRESSURE: 118 MMHG | HEART RATE: 91 BPM

## 2022-06-01 DIAGNOSIS — M81.0 OSTEOPOROSIS OF FEMUR WITHOUT PATHOLOGICAL FRACTURE: Primary | ICD-10-CM

## 2022-06-01 DIAGNOSIS — G71.01 BECKER'S MUSCULAR DYSTROPHY (HCC): ICD-10-CM

## 2022-06-01 DIAGNOSIS — M81.8 OTHER OSTEOPOROSIS WITHOUT CURRENT PATHOLOGICAL FRACTURE: ICD-10-CM

## 2022-06-01 PROCEDURE — 99204 OFFICE O/P NEW MOD 45 MIN: CPT | Performed by: INTERNAL MEDICINE

## 2022-06-01 NOTE — PROGRESS NOTES
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, multiple long bone fractures, anxiety/depression and ambulatory dysfunction  He had a recent fall with fracture of tibial plateau  He is weight bearing but uses wheelchair for mobility  DXA 3/9/22: tscores 0 8LS, -3 3 LTH and -3 0 Lt femoral neck  10 yr frax scores 8% for hip and 17% for major osteoporotic #  Patient Active Problem List   Diagnosis    Suazo's muscular dystrophy [facility] [address] )    Allergic rhinitis    Left tibial fracture    Right tibial fracture    Other insomnia    Ambulatory dysfunction    Depression with anxiety    Constipation    [facility]    Acquired genu recurvatum    Tibial plateau fracture    Disorder of rotator cuff    [facility]reditary progressive muscular dystrophy [facility]    Shoulder joint pain    COVID-19     Past Medical History:   Diagnosis Date    Allergic rhinitis 6/16/2020    Su[facility]'s [facility]     since 9th grade      Past Surgical History:   Procedure Laterality Date    MUSCLE BIOPSY        Family History   Problem Relation Age of Onset    Heart disease Sister      Social History     Tobacco Use    Smoking status: Never Smoker    Smokeless tobacco: Never Used   Substance Use Topics    Alcohol use:  Yes     Alcohol/week: 1 0 standard drink     Types: 1 Glasses of wine per week     Allergies   Allergen Reactions    Anesthesia S-I-40 [Propofol] Other (See Comments)     Malignant hypothermia    Sulfa Antibiotics Other (See Comments)         Current Outpatient Medications:     Cholecalciferol (Vitamin D) 50 MCG (2000 UT) CAPS, Take 1 capsule by mouth daily, Disp: , Rfl:     fluticasone (FLONASE) 50 mcg/act nasal spray, 2 sprays into each nostril 2 (two) times a day, Disp: 18 2 mL, Rfl: 1    fluticasone-salmeterol (Advair HFA) 230-21 MCG/ACT inhaler, Inhale 2 puffs 2 (two) times a day Rinse mouth after use , Disp: 12 g, Rfl: 1    meloxicam (MOBIC) 7 5 mg tablet, Take 7 5 mg by mouth daily, Disp: , Rfl:     methocarbamol (ROBAXIN) 500 mg tablet, Take 1 tablet (500 mg total) by mouth 2 (two) times a day, Disp: 30 tablet, Rfl: 0    Nutritional Supplements (CREATINE) 750 MG CAPS, Take 2 capsules by mouth daily, Disp: , Rfl:     Review of Systems   Constitutional: Positive for activity change, appetite change and fatigue  HENT: Negative  Eyes: Negative  Respiratory: Negative  Cardiovascular: Negative for chest pain  Gastrointestinal: Negative  Endocrine: Negative  Genitourinary: Negative  Musculoskeletal: Negative  Skin: Negative  Allergic/Immunologic: Negative  Neurological: Negative  Hematological: Negative  Psychiatric/Behavioral: Negative  All other systems reviewed and are negative  Physical Exam:  There is no height or weight on file to calculate BMI  There were no vitals taken for this visit  There were no vitals filed for this visit  Physical Exam  Constitutional:       Appearance: He is well-developed  HENT:      Head: Normocephalic  Eyes:      Pupils: Pupils are equal, round, and reactive to light  Neck:      Thyroid: No thyromegaly  Cardiovascular:      Rate and Rhythm: Normal rate  Heart sounds: Normal heart sounds  Pulmonary:      Effort: Pulmonary effort is normal       Breath sounds: Normal breath sounds  Abdominal:      General: Bowel sounds are normal       Palpations: Abdomen is soft  Musculoskeletal:         General: No deformity  Cervical back: Normal range of motion  Skin:     Capillary Refill: Capillary refill takes less than 2 seconds  Coloration: Skin is not pale  Findings: No rash  Neurological:      Mental Status: He is alert and oriented to person, place, and time           Labs:   Lab Results   Component Value Date    HGBA1C 5 2 01/30/2017       Lab Results   Component Value Date    GEE7VKQXBDVG 1 910 02/24/2021 V5CVCTN 11 7 01/30/2017       Lab Results   Component Value Date    CREATININE 0 38 (L) 01/10/2022    CREATININE 0 34 (L) 02/24/2021    CREATININE 0 29 (L) 06/16/2020    BUN 16 01/10/2022     10/21/2015    K 4 2 01/10/2022     01/10/2022    CO2 28 01/10/2022     eGFR   Date Value Ref Range Status   01/10/2022 139 ml/min/1 73sq m Final       Lab Results   Component Value Date    ALT 44 01/10/2022    AST 30 01/10/2022    ALKPHOS 106 01/10/2022    BILITOT 0 28 10/21/2015       Lab Results   Component Value Date    CHOLESTEROL 181 11/08/2017    CHOLESTEROL 196 01/30/2017     Lab Results   Component Value Date    HDL 57 11/08/2017    HDL 58 01/30/2017     Lab Results   Component Value Date    TRIG 101 11/08/2017    TRIG 107 01/30/2017     No results found for: NONHDLC      Impression:  1  Osteoporosis of femur without pathological fracture    2  Suazo's muscular dystrophy ([facility] [address] )    3  Other osteoporosis without current pathological fracture          Plan:    Gail Miranda was seen today for osteoporosis  Diagnoses and all orders for this visit:    Osteoporosis of femur without pathological fracture  He has muscular dystrophy leading to low peak bone mass and early onset osteoporosis  He will need medical therapy on top of diet and exercise  Today we discussed all aspects of osteoporosis including pathophysiology, risk factors, complications, diet, calcium 1200mg/day, vitamin D supplementation to maintain goal >30ng/dL, lifestyle modifications, medical fitness training, goals of therapy, follow up needs and medications including Alendronate, zolendronate, denosumab, romosozumab, foreo and tymlos  Over next visits, will consider starting reclast which is preferable to prolia at this time  He also qualifies for PTH analogues but may not need right now  Will check complete bone profile and follow up in 3 months to initiate therapy      -     Ambulatory Referral to Endocrinology  -     Comprehensive metabolic panel; Future  -     C-Telopeptide; Future  -     Phosphorus; Future  -     Protein electrophoresis, serum; Future  -     Protein electrophoresis, urine  -     PTH, intact; Future  -     Vitamin D 25 hydroxy; Future  -     Alkaline phosphatase, bone specific; Future  -     T4, free; Future  -     TSH, 3rd generation; Future    Suazo's muscular dystrophy ([facility] )    Other osteoporosis without current pathological fracture   -     T4, free; Future  -     TSH, 3rd generation; Future        I have spent 55 minutes with patient today in which greater than 50% of this time was spent in counseling/coordination of care  Discussed with the patient and all questioned fully answered  He will call me if any problems arise  Educated/ Counseled patient on diagnostic test results, prognosis, risk vs benefit of treatment options, importance of treatment compliance, healthy life and lifestyle choices        Gay Ochoa

## 2022-06-01 NOTE — TELEPHONE ENCOUNTER
Patient called and he states that he was seen virtually on 5/26/22 because he was COVID Positive  He would like to know if there is something that can be prescribed for him help with the mucus  He uses [id number]    Please advise

## 2022-06-02 NOTE — TELEPHONE ENCOUNTER
Mucinex or Mucinex DM as needed  Assume that you meant mucus that he was having difficulty coughing up    You may need to call him to clarify

## 2022-07-11 ENCOUNTER — APPOINTMENT (OUTPATIENT)
Dept: LAB | Facility: IMAGING CENTER | Age: 52
End: 2022-07-11
Payer: MEDICARE

## 2022-07-11 DIAGNOSIS — M81.8 OTHER OSTEOPOROSIS WITHOUT CURRENT PATHOLOGICAL FRACTURE: ICD-10-CM

## 2022-07-11 DIAGNOSIS — M81.0 OSTEOPOROSIS OF FEMUR WITHOUT PATHOLOGICAL FRACTURE: ICD-10-CM

## 2022-07-11 LAB
25(OH)D3 SERPL-MCNC: 64.4 NG/ML (ref 30–100)
ALBUMIN SERPL BCP-MCNC: 3.8 G/DL (ref 3.5–5)
ALP SERPL-CCNC: 103 U/L (ref 46–116)
ALT SERPL W P-5'-P-CCNC: 41 U/L (ref 12–78)
ANION GAP SERPL CALCULATED.3IONS-SCNC: 5 MMOL/L (ref 4–13)
AST SERPL W P-5'-P-CCNC: 29 U/L (ref 5–45)
BILIRUB SERPL-MCNC: 0.93 MG/DL (ref 0.2–1)
BUN SERPL-MCNC: 13 MG/DL (ref 5–25)
CALCIUM SERPL-MCNC: 9.4 MG/DL (ref 8.3–10.1)
CHLORIDE SERPL-SCNC: 109 MMOL/L (ref 100–108)
CO2 SERPL-SCNC: 23 MMOL/L (ref 21–32)
CREAT SERPL-MCNC: 0.29 MG/DL (ref 0.6–1.3)
GFR SERPL CREATININE-BSD FRML MDRD: 155 ML/MIN/1.73SQ M
GLUCOSE P FAST SERPL-MCNC: 92 MG/DL (ref 65–99)
PHOSPHATE SERPL-MCNC: 3.5 MG/DL (ref 2.7–4.5)
POTASSIUM SERPL-SCNC: 3.8 MMOL/L (ref 3.5–5.3)
PROT SERPL-MCNC: 7.7 G/DL (ref 6.4–8.2)
PTH-INTACT SERPL-MCNC: 50.4 PG/ML (ref 18.4–80.1)
SODIUM SERPL-SCNC: 137 MMOL/L (ref 136–145)
T4 FREE SERPL-MCNC: 1.11 NG/DL (ref 0.76–1.46)
TSH SERPL DL<=0.05 MIU/L-ACNC: 2.22 UIU/ML (ref 0.45–4.5)

## 2022-07-11 PROCEDURE — 84165 PROTEIN E-PHORESIS SERUM: CPT

## 2022-07-11 PROCEDURE — 80053 COMPREHEN METABOLIC PANEL: CPT

## 2022-07-11 PROCEDURE — 84100 ASSAY OF PHOSPHORUS: CPT

## 2022-07-11 PROCEDURE — 84166 PROTEIN E-PHORESIS/URINE/CSF: CPT | Performed by: PATHOLOGY

## 2022-07-11 PROCEDURE — 82306 VITAMIN D 25 HYDROXY: CPT

## 2022-07-11 PROCEDURE — 84080 ASSAY ALKALINE PHOSPHATASES: CPT

## 2022-07-11 PROCEDURE — 84166 PROTEIN E-PHORESIS/URINE/CSF: CPT | Performed by: INTERNAL MEDICINE

## 2022-07-11 PROCEDURE — 84439 ASSAY OF FREE THYROXINE: CPT

## 2022-07-11 PROCEDURE — 84165 PROTEIN E-PHORESIS SERUM: CPT | Performed by: PATHOLOGY

## 2022-07-11 PROCEDURE — 83970 ASSAY OF PARATHORMONE: CPT

## 2022-07-11 PROCEDURE — 84443 ASSAY THYROID STIM HORMONE: CPT

## 2022-07-11 PROCEDURE — 36415 COLL VENOUS BLD VENIPUNCTURE: CPT

## 2022-07-11 PROCEDURE — 82523 COLLAGEN CROSSLINKS: CPT

## 2022-07-12 LAB
ALBUMIN SERPL ELPH-MCNC: 4.16 G/DL (ref 3.5–5)
ALBUMIN SERPL ELPH-MCNC: 57 % (ref 52–65)
ALBUMIN UR ELPH-MCNC: 100 %
ALPHA1 GLOB MFR UR ELPH: 0 %
ALPHA1 GLOB SERPL ELPH-MCNC: 0.31 G/DL (ref 0.1–0.4)
ALPHA1 GLOB SERPL ELPH-MCNC: 4.2 % (ref 2.5–5)
ALPHA2 GLOB MFR UR ELPH: 0 %
ALPHA2 GLOB SERPL ELPH-MCNC: 0.61 G/DL (ref 0.4–1.2)
ALPHA2 GLOB SERPL ELPH-MCNC: 8.4 % (ref 7–13)
B-GLOBULIN MFR UR ELPH: 0 %
BETA GLOB ABNORMAL SERPL ELPH-MCNC: 0.52 G/DL (ref 0.4–0.8)
BETA1 GLOB SERPL ELPH-MCNC: 7.1 % (ref 5–13)
BETA2 GLOB SERPL ELPH-MCNC: 6.9 % (ref 2–8)
BETA2+GAMMA GLOB SERPL ELPH-MCNC: 0.5 G/DL (ref 0.2–0.5)
GAMMA GLOB ABNORMAL SERPL ELPH-MCNC: 1.2 G/DL (ref 0.5–1.6)
GAMMA GLOB MFR UR ELPH: 0 %
GAMMA GLOB SERPL ELPH-MCNC: 16.4 % (ref 12–22)
IGG/ALB SER: 1.33 {RATIO} (ref 1.1–1.8)
PROT PATTERN SERPL ELPH-IMP: NORMAL
PROT PATTERN UR ELPH-IMP: NORMAL
PROT SERPL-MCNC: 7.3 G/DL (ref 6.4–8.2)
PROT UR-MCNC: 6 MG/DL

## 2022-07-14 LAB
ALP BONE SERPL-MCNC: 15.4 UG/L (ref 7.5–26.1)
COLLAGEN CTX SERPL-MCNC: 968 PG/ML

## 2022-09-15 ENCOUNTER — OFFICE VISIT (OUTPATIENT)
Dept: ENDOCRINOLOGY | Facility: CLINIC | Age: 52
End: 2022-09-15
Payer: MEDICARE

## 2022-09-15 VITALS — SYSTOLIC BLOOD PRESSURE: 130 MMHG | DIASTOLIC BLOOD PRESSURE: 90 MMHG | HEART RATE: 76 BPM

## 2022-09-15 DIAGNOSIS — M80.80XD OTHER OSTEOPOROSIS WITH CURRENT PATHOLOGICAL FRACTURE WITH ROUTINE HEALING, SUBSEQUENT ENCOUNTER: Primary | ICD-10-CM

## 2022-09-15 DIAGNOSIS — G71.01 BECKER'S MUSCULAR DYSTROPHY (HCC): Chronic | ICD-10-CM

## 2022-09-15 DIAGNOSIS — R53.83 FATIGUE, UNSPECIFIED TYPE: ICD-10-CM

## 2022-09-15 PROCEDURE — 99214 OFFICE O/P EST MOD 30 MIN: CPT | Performed by: INTERNAL MEDICINE

## 2022-09-15 NOTE — PROGRESS NOTES
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, multiple long bone fractures, osteoporosis, anxiety/depression and ambulatory dysfunction  Last visit was 6/1/22      Since last visit, he feels well  No complaints  He has improved physical activity slightly  Continues to follow Dr Sameera Montelongo for muscular dystrophy      DXA 3/9/22: tscores 0 8LS, -3 3 LTH and -3 0 Lt femoral neck  10 yr frax scores 8% for hip and 17% for major osteoporotic #  Patient Active Problem List   Diagnosis    Suazo's muscular dystrophy ([facility] )    Allergic rhinitis    Left tibial fracture    Right tibial fracture    Other insomnia    Ambulatory dysfunction    Depression with anxiety    Constipation    F[facility] cardiomyopathy ([facility] )    Acquired genu recurvatum    Tibial plateau fracture    Disorder of rotator cuff    Hereditary progressive muscular dystrophy ([facility] )    Shoulder joint pain    COVID-19     Past Medical History:   Diagnosis Date    Allergic rhinitis 6/16/2020    Suazo's muscular dystrophy ([facility] )     since 9th grade      Past Surgical History:   Procedure Laterality Date    MUSCLE BIOPSY        Family History   Problem Relation Age of Onset    Heart disease Sister      Social History     Tobacco Use    Smoking status: Never Smoker    Smokeless tobacco: Never Used   Substance Use Topics    Alcohol use:  Yes     Alcohol/week: 1 0 standard drink     Types: 1 Glasses of wine per week     Allergies   Allergen Reactions    Anesthesia S-I-40 [Propofol] Other (See Comments)     Malignant hypothermia    Sulfa Antibiotics Other (See Comments)         Current Outpatient Medications:     Cholecalciferol (Vitamin D) 50 MCG (2000 UT) CAPS, Take 1 capsule by mouth daily, Disp: , Rfl:     fluticasone (FLONASE) 50 mcg/act nasal spray, 2 sprays into each nostril 2 (two) times a day, Disp: 18 2 mL, Rfl: 1    Nutritional Supplements (CREATINE) 750 MG CAPS, Take 2 capsules by mouth daily, Disp: , Rfl:     fluticasone-salmeterol (Advair HFA) 230-21 MCG/ACT inhaler, Inhale 2 puffs 2 (two) times a day Rinse mouth after use  (Patient not taking: Reported on 9/15/2022), Disp: 12 g, Rfl: 1    Review of Systems   Constitutional: Positive for activity change, appetite change and fatigue  HENT: Negative  Eyes: Negative  Respiratory: Negative  Cardiovascular: Negative for chest pain  Gastrointestinal: Negative  Endocrine: Negative  Genitourinary: Negative  Musculoskeletal: Negative  Skin: Negative  Allergic/Immunologic: Negative  Neurological: Negative  Hematological: Negative  Psychiatric/Behavioral: Negative  All other systems reviewed and are negative  Physical Exam:  There is no height or weight on file to calculate BMI  /90   Pulse 76    Vitals:        Physical Exam  Constitutional:       Appearance: He is well-developed  Comments: Wheelchair bound   HENT:      Head: Normocephalic  Eyes:      Pupils: Pupils are equal, round, and reactive to light  Neck:      Thyroid: No thyromegaly  Cardiovascular:      Rate and Rhythm: Normal rate  Heart sounds: Normal heart sounds  Pulmonary:      Effort: Pulmonary effort is normal       Breath sounds: Normal breath sounds  Abdominal:      General: Bowel sounds are normal       Palpations: Abdomen is soft  Musculoskeletal:         General: No deformity  Cervical back: Normal range of motion  Skin:     Capillary Refill: Capillary refill takes less than 2 seconds  Coloration: Skin is not pale  Findings: No rash  Neurological:      Mental Status: He is alert and oriented to person, place, and time           Labs:   Lab Results   Component Value Date    HGBA1C 5 2 01/30/2017       Lab Results   Component Value Date    DBQ1PEHTDHNY 2 220 07/11/2022    U6IFLYJ 11 7 01/30/2017       Lab Results   Component Value Date CREATININE 0 29 (L) 07/11/2022    CREATININE 0 38 (L) 01/10/2022    CREATININE 0 34 (L) 02/24/2021    BUN 13 07/11/2022     10/21/2015    K 3 8 07/11/2022     (H) 07/11/2022    CO2 23 07/11/2022     eGFR   Date Value Ref Range Status   07/11/2022 155 ml/min/1 73sq m Final       Lab Results   Component Value Date    ALT 41 07/11/2022    AST 29 07/11/2022    ALKPHOS 103 07/11/2022    BILITOT 0 28 10/21/2015       Lab Results   Component Value Date    CHOLESTEROL 181 11/08/2017    CHOLESTEROL 196 01/30/2017     Lab Results   Component Value Date    HDL 57 11/08/2017    HDL 58 01/30/2017     Lab Results   Component Value Date    TRIG 101 11/08/2017    TRIG 107 01/30/2017     No results found for: NONHDLC      Impression:  1  Other osteoporosis with current pathological fracture with routine healing, subsequent encounter    2  [facility]    3  Fatigue, unspecified type         Plan:    Diagnoses and all orders for this visit:    Other osteoporosis with current pathological fracture with routine healing, subsequent encounter  Today we discussed medications for osteoporosis again after patient and family reviewed the information provided last visit  They are concerned about side effects  Patient wishes to consider testosterone therapy to improve bone health and reduce fracture risk for future  I advised that it is not an approved therapy for osteoporosis  If he indeed has hypogonadism, we can treat the hypogonadism and as a side effects we may see some improvement in bone mass  Among fda approved medications, I would favor zoledronic acid for now  Follow up in 6 months  -     Follicle stimulating hormone; Future  -     Luteinizing hormone; Future  -     Prolactin; Future    Suazo's muscular dystrophy ([facility])    Fatigue, unspecified type  -     Estradiol;  Future  -     Sex Hormone Binding Globulin  -     Testosterone, free, total; Future        I have spent 35 minutes with patient today in which greater than 50% of this time was spent in counseling/coordination of care  Discussed with the patient and all questioned fully answered  He will call me if any problems arise  Educated/ Counseled patient on diagnostic test results, prognosis, risk vs benefit of treatment options, importance of treatment compliance, healthy life and lifestyle choices        [address]

## 2022-10-27 DIAGNOSIS — U07.1 COVID-19: ICD-10-CM

## 2022-10-27 RX ORDER — FLUTICASONE PROPIONATE 50 MCG
2 SPRAY, SUSPENSION (ML) NASAL 2 TIMES DAILY
Qty: 18.2 ML | Refills: 1 | Status: SHIPPED | OUTPATIENT
Start: 2022-10-27

## 2022-11-07 ENCOUNTER — APPOINTMENT (OUTPATIENT)
Dept: LAB | Age: 52
End: 2022-11-07

## 2022-11-07 DIAGNOSIS — M81.0 SENILE OSTEOPOROSIS: ICD-10-CM

## 2022-11-07 LAB — 25(OH)D3 SERPL-MCNC: 56.3 NG/ML (ref 30–100)

## 2023-02-16 ENCOUNTER — APPOINTMENT (OUTPATIENT)
Dept: LAB | Facility: IMAGING CENTER | Age: 53
End: 2023-02-16

## 2023-02-16 DIAGNOSIS — R53.83 FATIGUE, UNSPECIFIED TYPE: ICD-10-CM

## 2023-02-16 DIAGNOSIS — M80.80XD OTHER OSTEOPOROSIS WITH CURRENT PATHOLOGICAL FRACTURE WITH ROUTINE HEALING, SUBSEQUENT ENCOUNTER: ICD-10-CM

## 2023-02-16 LAB
ESTRADIOL SERPL-MCNC: 22 PG/ML (ref 11–52.5)
FSH SERPL-ACNC: 4.2 MIU/ML (ref 0.7–10.8)
LH SERPL-ACNC: 2.4 MIU/ML (ref 1.2–10.6)
PROLACTIN SERPL-MCNC: 12.5 NG/ML (ref 2.5–17.4)

## 2023-02-17 LAB
SHBG SERPL-SCNC: 28.5 NMOL/L (ref 19.3–76.4)
TESTOST FREE SERPL-MCNC: 4.5 PG/ML (ref 7.2–24)
TESTOST SERPL-MCNC: 231 NG/DL (ref 264–916)

## 2023-02-20 DIAGNOSIS — U07.1 COVID-19: ICD-10-CM

## 2023-02-20 RX ORDER — LOTEPREDNOL ETABONATE 5 MG/ML
SUSPENSION/ DROPS OPHTHALMIC
COMMUNITY
End: 2023-05-30 | Stop reason: ALTCHOICE

## 2023-02-20 RX ORDER — FLUTICASONE PROPIONATE AND SALMETEROL XINAFOATE 230; 21 UG/1; UG/1
2 AEROSOL, METERED RESPIRATORY (INHALATION) 2 TIMES DAILY
Qty: 12 G | Refills: 1 | Status: SHIPPED | OUTPATIENT
Start: 2023-02-20

## 2023-03-06 ENCOUNTER — OFFICE VISIT (OUTPATIENT)
Dept: FAMILY MEDICINE CLINIC | Facility: CLINIC | Age: 53
End: 2023-03-06

## 2023-03-06 VITALS
RESPIRATION RATE: 16 BRPM | TEMPERATURE: 98.1 F | OXYGEN SATURATION: 98 % | SYSTOLIC BLOOD PRESSURE: 122 MMHG | DIASTOLIC BLOOD PRESSURE: 78 MMHG | HEART RATE: 90 BPM

## 2023-03-06 DIAGNOSIS — J06.9 ACUTE UPPER RESPIRATORY INFECTION: Primary | ICD-10-CM

## 2023-03-06 DIAGNOSIS — I42.9 FAMILIAL CARDIOMYOPATHY (HCC): ICD-10-CM

## 2023-03-06 DIAGNOSIS — R05.1 ACUTE COUGH: ICD-10-CM

## 2023-03-06 DIAGNOSIS — G71.01 BECKER'S MUSCULAR DYSTROPHY (HCC): ICD-10-CM

## 2023-03-06 DIAGNOSIS — U07.1 COVID-19: ICD-10-CM

## 2023-03-06 RX ORDER — FLUTICASONE PROPIONATE 50 MCG
2 SPRAY, SUSPENSION (ML) NASAL DAILY
Qty: 48 G | Refills: 1 | Status: SHIPPED | OUTPATIENT
Start: 2023-03-06

## 2023-03-06 NOTE — PROGRESS NOTES
Name: Paul Petersen      : 1970      MRN: [id number]  Encounter Provider: Ludin Barboza PA-C  Encounter Date: 3/6/2023   Encounter department: [address] [place] [address]     1  Acute upper respiratory infection    2  Suazo's muscular dystrophy ([facility] [address] )    3  Familial cardiomyopathy ([facility] )    4  Acute cough    5  COVID-19  -     fluticasone (FLONASE) 50 mcg/act nasal spray; 2 sprays into each nostril daily          - I did advise him that he could take the over-the-counter Advil Cold and Sinus which she has at home  - He will continue on Flonase which I did send a refill to the pharmacy  - Increase clear liquids  - Recommend follow-up or go to the ER with any increasing symptoms  Otherwise recommend follow-up with Dr Sridhar Wei for his muscular dystrophy, familial cardiomyopathy, Medicare wellness  M*Modal software was used to dictate this note  It may contain errors with dictating incorrect words/spelling  Please contact provider directly for any questions  Subjective      Patient presents today for an acute visit for evaluation of upper respiratory symptoms/cough that started about 8 days ago  Denies any known fever, chills at times  He does notice an occasional postnasal drip  He has Advil Cold and Sinus but he was not sure if this would be appropriate for his illness  He just recently started Flonase again and would like a refill  Denies any shortness of breath  He has not checked a COVID test   No known sick contacts  Review of Systems   Constitutional: Positive for chills  Negative for fever  HENT: Positive for congestion and postnasal drip  Respiratory: Positive for cough  Negative for shortness of breath          Current Outpatient Medications on File Prior to Visit   Medication Sig   • Cholecalciferol (Vitamin D) 50 MCG (2000) CAPS Take 1 capsule by mouth daily   • fluticasone-salmeterol (Advair HFA) 230-21 MCG/ACT inhaler Inhale 2 puffs 2 (two) times a day Rinse mouth after use  • Nutritional Supplements (CREATINE) 750 MG CAPS Take 2 capsules by mouth daily   • [DISCONTINUED] fluticasone (FLONASE) 50 mcg/act nasal spray 2 sprays into each nostril 2 (two) times a day   • loteprednol etabonate (LOTEMAX) 0 5 % ophthalmic suspension Lotemax 0 5 % eye drops,suspension   INSTILL ONE DROP IN [facility] EYE THREE TIMES DAILY (Patient not taking: Reported on 3/6/2023)       Objective     /78 (BP Location: Right arm, Patient Position: Sitting, Cuff Size: Standard)   Pulse 90   Temp 98 1 °F (36 7 °C) (Tympanic)   Resp 16   SpO2 98%     Physical Exam  Vitals reviewed  Constitutional:       General: He is not in acute distress  Appearance: Normal appearance  He is not ill-appearing, toxic-appearing or diaphoretic  HENT:      Head: Normocephalic and atraumatic  Right Ear: Tympanic membrane, ear canal and external ear normal       Left Ear: Tympanic membrane, ear canal and external ear normal       Mouth/Throat:      Comments: He did have difficulty relaxing his tongue to view the posterior pharynx  Cardiovascular:      Rate and Rhythm: Normal rate and regular rhythm  Heart sounds: No murmur heard  Pulmonary:      Effort: Pulmonary effort is normal  No respiratory distress  Breath sounds: Normal breath sounds  No wheezing, rhonchi or rales  Musculoskeletal:      Cervical back: Neck supple  Lymphadenopathy:      Cervical: No cervical adenopathy  Neurological:      General: No focal deficit present  Mental Status: He is alert  Psychiatric:         Mood and Affect: Mood normal          Behavior: Behavior normal          Thought Content:  Thought content normal          Judgment: Judgment normal        Lilli Jaramillo PA-C

## 2023-03-08 DIAGNOSIS — U07.1 COVID-19: ICD-10-CM

## 2023-03-08 RX ORDER — FLUTICASONE PROPIONATE AND SALMETEROL XINAFOATE 230; 21 UG/1; UG/1
AEROSOL, METERED RESPIRATORY (INHALATION)
Qty: 12 G | Refills: 1 | Status: SHIPPED | OUTPATIENT
Start: 2023-03-08

## 2023-03-14 ENCOUNTER — RA CDI HCC (OUTPATIENT)
Dept: OTHER | Facility: HOSPITAL | Age: 53
End: 2023-03-14

## 2023-03-14 NOTE — PROGRESS NOTES
[address]  coding opportunities       Chart reviewed, no opportunity found: CHART REVIEWED, NO OPPORTUNITY FOUND        Patients Insurance     Medicare Insurance: Medicare

## 2023-04-25 ENCOUNTER — OFFICE VISIT (OUTPATIENT)
Dept: ENDOCRINOLOGY | Facility: CLINIC | Age: 53
End: 2023-04-25

## 2023-04-25 VITALS
TEMPERATURE: 97.8 F | OXYGEN SATURATION: 97 % | SYSTOLIC BLOOD PRESSURE: 122 MMHG | HEART RATE: 75 BPM | DIASTOLIC BLOOD PRESSURE: 76 MMHG

## 2023-04-25 DIAGNOSIS — E29.1 HYPOGONADISM IN MALE: ICD-10-CM

## 2023-04-25 DIAGNOSIS — G71.09 HEREDITARY PROGRESSIVE MUSCULAR DYSTROPHY (HCC): ICD-10-CM

## 2023-04-25 DIAGNOSIS — M81.0 OSTEOPOROSIS, UNSPECIFIED OSTEOPOROSIS TYPE, UNSPECIFIED PATHOLOGICAL FRACTURE PRESENCE: Primary | ICD-10-CM

## 2023-04-25 NOTE — PATIENT INSTRUCTIONS
Clomiphene (By mouth)   Clomiphene (ENPV-al-yweg)  Treats infertility  Brand Name(s): Clomid   There may be other brand names for this medicine  When This Medicine Should Not Be Used: You should not use this medicine if you have had an allergic reaction to clomiphene, are pregnant (or think you might be pregnant), or if you have liver disease, cysts on the ovaries, enlarged ovaries, or unusual vaginal bleeding  How to Use This Medicine:   Tablet  Take this medicine exactly as your doctor ordered  Your doctor will tell you how much to take and when to start  Take your medicine at the same time every day  You should be checked for pregnancy or ovulation by your doctor between treatments  You may need to take and record your basal body temperature every day - ask your doctor  If a dose is missed: Take the missed dose as soon as you remember  Tell your doctor if you have missed any doses, because this may affect your treatment  How to Store and Dispose of This Medicine:   Store at room temperature  Keep clomiphene away from heat, moisture, and light  Keep all medicine out of the reach of children  Drugs and Foods to Avoid:      Ask your doctor or pharmacist before using any other medicine, including over-the-counter medicines, vitamins, and herbal products  Warnings While Using This Medicine: If you think you might be pregnant, stop taking this medicine and tell your doctor  Talk to your doctor before taking this medicine if you have thyroid problems  Multiple births (such as twins or triplets) are possible when taking clomiphene  This medicine may cause changes in vision such as blurring or trouble focusing  If this becomes severe, tell your doctor  Be careful driving or operating machinery  Possible Side Effects While Using This Medicine:   Call your doctor right away if you notice any of these side effects:  Severe lower stomach pain  Nausea, vomiting  Weight gain    Blurred vision  If you "notice these less serious side effects, talk with your doctor: \"Hot flashes\"  Breast tenderness  Dizziness  Nervousness or trouble sleeping  Headache  Stomach pain and bloating  Mood changes  If you notice other side effects that you think are caused by this medicine, tell your doctor  Call your doctor for medical advice about side effects  You may report side effects to FDA at 7-374-FDA-8110  © Copyright [employer] 2022 Information is for End User's use only and may not be sold, redistributed or otherwise used for commercial purposes  The above information is an  only  It is not intended as medical advice for individual conditions or treatments  Talk to your doctor, nurse or pharmacist before following any medical regimen to see if it is safe and effective for you      "

## 2023-04-25 NOTE — PROGRESS NOTES
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, multiple long bone fractures, osteoporosis, anxiety/depression and ambulatory dysfunction  Last visit was 9/15/22      Since last visit, he feels well  No complaints  He has improved physical activity significantly and feels better  Follows Dr Veronika Du for muscular dystrophy      DXA 3/9/22: tscores 0 8LS, -3 3 LTH and -3 0 Lt femoral neck  10 yr frax scores 8% for hip and 17% for major osteoporotic #  Patient Active Problem List   Diagnosis   • Suazo's muscular dystrophy ([facility] )   • Allergic rhinitis   • Left tibial fracture   • Right tibial fracture   • Other insomnia   • Ambulatory dysfunction   • Depression with anxiety   • Constipation   • Familial cardiomyopathy ([facility] )   • Acquired genu recurvatum   • Tibial plateau fracture   • Disorder of rotator cuff   • Hereditary progressive muscular dystrophy ([facility] )   • Shoulder joint pain   • COVID-19   • Ankle deformity, unspecified laterality     Past Medical History:   Diagnosis Date   • Allergic rhinitis 6/16/2020   • Suazo's muscular dystrophy ([facility] )     since 9th grade      Past Surgical History:   Procedure Laterality Date   • MUSCLE BIOPSY        Family History   Problem Relation Age of Onset   • Heart disease Sister      Social History     Tobacco Use   • Smoking status: Never   • Smokeless tobacco: Never   Substance Use Topics   • Alcohol use:  Yes     Alcohol/week: 1 0 standard drink     Types: 1 Glasses of wine per week     Allergies   Allergen Reactions   • Anesthesia S-I-40 [Propofol] Other (See Comments)     Malignant hypothermia   • Sulfa Antibiotics Other (See Comments)         Current Outpatient Medications:   •  Advair -21 MCG/ACT inhaler, INHALE TWO PUFFS TWICE DAILY RINSE MOUTH AFTER USE, Disp: 12 g, Rfl: 1  •  Cholecalciferol (Vitamin D) 50 MCG (2000 UT) CAPS, Take 1 capsule by mouth daily, Disp: , Rfl:   •  fluticasone (FLONASE) 50 mcg/act nasal spray, 2 sprays into each nostril daily, Disp: 48 g, Rfl: 1  •  loteprednol etabonate (LOTEMAX) 0 5 % ophthalmic suspension, Lotemax 0 5 % eye drops,suspension  INSTILL ONE DROP IN [facility] EYE THREE TIMES DAILY (Patient not taking: Reported on 3/6/2023), Disp: , Rfl:   •  Nutritional Supplements (CREATINE) 750 MG CAPS, Take 2 capsules by mouth daily, Disp: , Rfl:     Review of Systems   Constitutional: Positive for activity change and appetite change  HENT: Negative  Eyes: Negative  Respiratory: Negative  Cardiovascular: Negative for chest pain  Gastrointestinal: Negative  Endocrine: Negative  Genitourinary: Negative  Musculoskeletal: Negative  Skin: Negative  Allergic/Immunologic: Negative  Neurological: Negative  Hematological: Negative  Psychiatric/Behavioral: Negative  All other systems reviewed and are negative  Physical Exam:  There is no height or weight on file to calculate BMI  /76   Pulse 75   Temp 97 8 °F (36 6 °C)   SpO2 97%    Vitals:        Physical Exam  Constitutional:       General: He is not in acute distress  Appearance: He is well-developed  He is not ill-appearing  HENT:      Head: Normocephalic and atraumatic  Nose: Nose normal       Mouth/Throat:      Pharynx: Oropharynx is clear  Eyes:      Extraocular Movements: Extraocular movements intact  Conjunctiva/sclera: Conjunctivae normal    Neck:      Thyroid: No thyromegaly  Cardiovascular:      Rate and Rhythm: Normal rate  Pulmonary:      Effort: Pulmonary effort is normal    Musculoskeletal:         General: No deformity  Cervical back: Normal range of motion  Skin:     Capillary Refill: Capillary refill takes less than 2 seconds  Coloration: Skin is not pale  Findings: No rash  Neurological:      Mental Status: He is alert and oriented to person, place, and time     Psychiatric:         Behavior: Behavior normal          Labs:   Lab Results   Component Value Date    HGBA1C 5 2 01/30/2017       Lab Results   Component Value Date    FIO7GLMJHGCD 2 220 07/11/2022    N1UXKIM 11 7 01/30/2017       Lab Results   Component Value Date    CREATININE 0 29 (L) 07/11/2022    CREATININE 0 38 (L) 01/10/2022    CREATININE 0 34 (L) 02/24/2021    BUN 13 07/11/2022     10/21/2015    K 3 8 07/11/2022     (H) 07/11/2022    CO2 23 07/11/2022     eGFR   Date Value Ref Range Status   07/11/2022 155 ml/min/1 73sq m Final       Lab Results   Component Value Date    ALT 41 07/11/2022    AST 29 07/11/2022    ALKPHOS 103 07/11/2022    BILITOT 0 28 10/21/2015       Lab Results   Component Value Date    CHOLESTEROL 181 11/08/2017    CHOLESTEROL 196 01/30/2017     Lab Results   Component Value Date    HDL 57 11/08/2017    HDL 58 01/30/2017     Lab Results   Component Value Date    TRIG 101 11/08/2017    TRIG 107 01/30/2017     No results found for: NONHDLC      Impression:  1  Osteoporosis, unspecified osteoporosis type, unspecified pathological fracture presence    2  Hereditary progressive muscular dystrophy ([address] )    3  Hypogonadism in male         Plan:    Marquis Jack was seen today for osteoporosis  Diagnoses and all orders for this visit:    Osteoporosis, unspecified osteoporosis type, unspecified pathological fracture presence  He has significant osteoporosis with a high FRAX score as well as a fracture consistent with BMD   His major risk factor is muscular dystrophy associated with probable low peak bone mass  Today I reiterated my suggestion to use zoledronic acid earlier than later followed by the bisphosphonate holiday  Patient wishes to wait till the next DEXA bone scan to make that decision and I think that is reasonable  Advised to continue regular physical activity, calcium 1000 to 1200 mg daily and vitamin D3 2000 IU daily  Follow-up in 1 year    -     DXA bone density spine hip and pelvis; Future    Hereditary progressive muscular dystrophy ([facility] [address] )    Hypogonadism in male  His total and free testosterone were on the low side associated with a low normal FSH and LH suggesting hypogonadotropic hypogonadism  These levels may be normal for his physiology  Also given immobility there may be increased risk of VTE with use of testosterone replacement therapy  We discussed pros and cons and I recommended to consider using clomiphene citrate for 3 to 6 months to see if that will improve total and free testosterone levels  Unfortunately this patient's baseline testosterone levels will be a guesstimate given his background Suazo's muscular dystrophy  For now based on guidelines, we will repeat testosterone levels for accuracy  Follow-up in 6 to 12 months  -     Sex Hormone Binding Globulin  -     Testosterone, free, total; Future  -     Estradiol; Future        I have spent 35 minutes with patient today in which greater than 50% of this time was spent in counseling/coordination of care  Discussed with family members during the visit and questions were answered  Discussed with the patient and all questioned fully answered  He will call me if any problems arise  Educated/ Counseled patient on diagnostic test results, prognosis, risk vs benefit of treatment options, importance of treatment compliance, healthy life and lifestyle choices        [address]

## 2023-05-02 ENCOUNTER — TELEPHONE (OUTPATIENT)
Dept: FAMILY MEDICINE CLINIC | Facility: CLINIC | Age: 53
End: 2023-05-02

## 2023-05-03 NOTE — TELEPHONE ENCOUNTER
Pt was called informed that any concerns he has he would need to schedule an appointment to be seen  Pt was also informed that his Medicare Wellness is due  Pt was scheduled for 5/18/23

## 2023-05-10 ENCOUNTER — TELEPHONE (OUTPATIENT)
Dept: ENDOCRINOLOGY | Facility: CLINIC | Age: 53
End: 2023-05-10

## 2023-05-15 NOTE — TELEPHONE ENCOUNTER
Pt called in again asking about the DEXA scan and would like an answer today so that if he needs to resched that test he can

## 2023-05-18 ENCOUNTER — HOSPITAL ENCOUNTER (OUTPATIENT)
Dept: BONE DENSITY | Facility: IMAGING CENTER | Age: 53
Discharge: HOME | End: 2023-05-18

## 2023-05-18 VITALS — HEIGHT: 70 IN | BODY MASS INDEX: 22.9 KG/M2 | WEIGHT: 160 LBS

## 2023-05-18 DIAGNOSIS — M81.0 OSTEOPOROSIS, UNSPECIFIED OSTEOPOROSIS TYPE, UNSPECIFIED PATHOLOGICAL FRACTURE PRESENCE: ICD-10-CM

## 2023-05-30 ENCOUNTER — OFFICE VISIT (OUTPATIENT)
Dept: FAMILY MEDICINE CLINIC | Facility: CLINIC | Age: 53
End: 2023-05-30

## 2023-05-30 VITALS
DIASTOLIC BLOOD PRESSURE: 80 MMHG | RESPIRATION RATE: 16 BRPM | TEMPERATURE: 97.8 F | SYSTOLIC BLOOD PRESSURE: 122 MMHG | HEIGHT: 70 IN | BODY MASS INDEX: 22.96 KG/M2 | OXYGEN SATURATION: 98 % | HEART RATE: 73 BPM

## 2023-05-30 DIAGNOSIS — E29.1 TESTOSTERONE DEFICIENCY IN MALE: ICD-10-CM

## 2023-05-30 DIAGNOSIS — S82.143S CLOSED FRACTURE OF TIBIAL PLATEAU, UNSPECIFIED LATERALITY, SEQUELA: ICD-10-CM

## 2023-05-30 DIAGNOSIS — G71.01 BECKER'S MUSCULAR DYSTROPHY (HCC): Chronic | ICD-10-CM

## 2023-05-30 DIAGNOSIS — M80.80XG OTHER OSTEOPOROSIS WITH CURRENT PATHOLOGICAL FRACTURE WITH DELAYED HEALING, SUBSEQUENT ENCOUNTER: ICD-10-CM

## 2023-05-30 DIAGNOSIS — Z00.00 MEDICARE ANNUAL WELLNESS VISIT, SUBSEQUENT: Primary | ICD-10-CM

## 2023-05-30 DIAGNOSIS — Z12.11 COLON CANCER SCREENING: ICD-10-CM

## 2023-05-30 PROBLEM — U07.1 COVID-19: Status: RESOLVED | Noted: 2022-05-26 | Resolved: 2023-05-30

## 2023-05-30 NOTE — PROGRESS NOTES
Answers for HPI/ROS submitted by the patient on 5/23/2023  How would you rate your overall health?: good  Compared to last year, how is your physical health?: same  In general, how satisfied are you with your life?: satisfied  Compared to last year, how is your eyesight?: same  Compared to last year, how is your hearing?: same  Compared to last year, how is your emotional/mental health?: same  How often is anger a problem for you?: never, rarely  How often do you feel unusually tired/fatigued?: sometimes  In the past 7 days, how much pain have you experienced?: none  In the past 6 months, have you lost or gained 10 pounds without trying?: Yes  One or more falls in the last year: No  Do you have trouble with the stairs inside or outside your home?: Yes  Does your home have working smoke alarms?: Yes  Does your home have a carbon monoxide monitor?: Yes  Which safety hazards (if any) have you experienced in your home? Please select all that apply : none  How would you describe your current diet?  Please select all that apply : Regular  In addition to prescription medications, are you taking any over-the-counter supplements?: No  Can you manage your medications?: Yes  Are you currently taking any opioid medications?: No  Can you walk and transfer into and out of your bed and chair?: Yes  Can you dress and groom yourself?: Yes  Can you bathe or shower yourself?: No  Can you feed yourself?: Yes  Can you do your laundry/ housekeeping?: Yes  Can you manage your money, pay your bills, and track your expenses?: Yes  Can you make your own meals?: Yes  Can you do your own shopping?: No  Please list your DME (Durable Medical Equipment) supplier, if you use one : [employer]  Within the last 12 months, have you had any hospitalizations or Emergency Department visits?: No  Do you have a living will?: No  Do you have a Durable POA (Power of ) for healthcare decisions?: No  Do you have an Advanced Directive for end of life decisions?: No  How often have you used an illegal drug (including marijuana) or a prescription medication for non-medical reasons in the past year?: never  What is the typical number of drinks you consume in a day?: 1  What is the typical number of drinks you consume in a week?: 1  How often did you have a drink containing alcohol in the past year?: never  How many drinks did you have on a typical day  when you were drinking in the past year?: 0  How often did you have 6 or more drinks on one occasion in the past year?: never    Assessment/Plan: This 27-year-old male is here for Medicare wellness subsequent  Patient is up-to-date completely on his immunizations  Apparently had COVID twice the last time in March and therefore would not require current vaccination until the fall  He was notified of this but seems inclined not to get his COVID vaccination  We discussed colorectal cancer screening  He is reluctant to undergo anesthesia for colonoscopy due to his muscular dystrophy and to take the prep since he is wheelchair-bound  He did agree to have Cologuard testing  Patient complains of intermittent increased weakness of his right arm  He is referred to his muscular dystrophy specialist to evaluate this further  Patient has osteoporosis, well-documented with DEXA scanning  Intravenous zoledronic acid recommended by his endocrinologist as well as primary care  Questions answered  He will return to endocrinology for this portion of his care  In addition he was shown to have mildly depressed free and total testosterone  He will discuss this further with endocrinology also  Diet reviewed  Lifestyle modifications reviewed  Medications reviewed and ordered  Laboratory tests and studies reviewed and ordered  All patient's questions answered to patient satisfaction      Chief Complaint   Patient presents with   • Medicare Wellness Visit     Pt had concerns with his right arm he will discuss CT          Diagnoses and all orders for this visit:    Medicare annual wellness visit, subsequent  -     CBC and differential; Future  -     Comprehensive metabolic panel; Future  -     Cologuard    Suazo's muscular dystrophy ([facility] [facility] )  -     CBC and differential; Future  -     Comprehensive metabolic panel; Future  -     Cologuard    Other osteoporosis with current pathological fracture with delayed healing, subsequent encounter  -     CBC and differential; Future  -     Comprehensive metabolic panel; Future  -     Cologuard    Closed fracture of tibial plateau, unspecified laterality, sequela    Colon cancer screening  -     CBC and differential; Future  -     Comprehensive metabolic panel; Future  -     Cologuard    Testosterone deficiency in male        Subjective: This 65-year-old male is here for Medicare wellness subsequent  Patient is up-to-date completely on his immunizations  Apparently had COVID twice the last time in March and therefore would not require current vaccination until the fall  He was notified of this but seems inclined not to get his COVID vaccination  We discussed colorectal cancer screening  He is reluctant to undergo anesthesia for colonoscopy due to his muscular dystrophy and to take the prep since he is wheelchair-bound  He did agree to have Cologuard testing  Patient complains of intermittent increased weakness of his right arm  He is referred to his muscular dystrophy specialist to evaluate this further  Patient has osteoporosis, well-documented with DEXA scanning  Intravenous zoledronic acid recommended by his endocrinologist as well as primary care  Questions answered  He will return to endocrinology for this portion of his care  In addition he was shown to have mildly depressed free and total testosterone  He will discuss this further with endocrinology also  Patient ID: Graciela Schulz is a 47 y o  male          Current Outpatient Medications:   • Cholecalciferol (Vitamin D) 50 MCG (2000 UT) CAPS, Take 1 capsule by mouth daily, Disp: , Rfl:   •  fluticasone (FLONASE) 50 mcg/act nasal spray, 2 sprays into each nostril daily, Disp: 48 g, Rfl: 1  •  Nutritional Supplements (CREATINE) 750 MG CAPS, Take 2 capsules by mouth daily, Disp: , Rfl:     The following portions of the patient's history were reviewed and updated as appropriate: allergies, current medications, past family history, past medical history, past social history, past surgical history and problem list     Review of Systems   Constitutional: Negative for appetite change, fatigue, fever and unexpected weight change  HENT: Negative for rhinorrhea, sinus pressure, sinus pain, sneezing and sore throat  Eyes: Negative for visual disturbance  Respiratory: Negative for cough, chest tightness, shortness of breath and wheezing  Cardiovascular: Negative for chest pain, palpitations and leg swelling  Gastrointestinal: Negative for abdominal distention, abdominal pain, blood in stool, constipation, diarrhea, nausea and vomiting  Endocrine: Negative for polydipsia and polyuria  Genitourinary: Negative for decreased urine volume, difficulty urinating, dysuria, hematuria and urgency  Musculoskeletal: Negative for arthralgias, back pain, joint swelling and neck pain  Skin: Negative for rash  Allergic/Immunologic: Negative for environmental allergies  Neurological: Negative for tremors, weakness, light-headedness, numbness and headaches  Hematological: Does not bruise/bleed easily  Psychiatric/Behavioral: Negative for agitation, behavioral problems, confusion and dysphoric mood  The patient is not nervous/anxious            Family History   Problem Relation Age of Onset   • Heart disease Sister        Past Medical History:   Diagnosis Date   • Allergic rhinitis 6/16/2020   • Suazo's muscular dystrophy ([facility] [facility] )     since 9th grade       Past Surgical History:   Procedure Laterality Date   • "MUSCLE BIOPSY         Social History     Socioeconomic History   • Marital status: Single     Spouse name: None   • Number of children: None   • Years of education: None   • Highest education level: None   Occupational History   • None   Tobacco Use   • Smoking status: Never   • Smokeless tobacco: Never   Vaping Use   • Vaping Use: Never used   Substance and Sexual Activity   • Alcohol use: Yes     Alcohol/week: 1 0 standard drink of alcohol     Types: 1 Glasses of wine per week   • Drug use: No   • Sexual activity: Not Currently     Partners: Female   Other Topics Concern   • None   Social History Narrative   • None     Social Determinants of Health     Financial Resource Strain: Low Risk  (5/23/2023)    Overall Financial Resource Strain (CARDIA)    • Difficulty of Paying Living Expenses: Not very hard   Food Insecurity: Not on file   Transportation Needs: No Transportation Needs (5/23/2023)    PRAPARE - Transportation    • Lack of Transportation (Medical): No    • Lack of Transportation (Non-Medical): No   Physical Activity: Not on file   Stress: Not on file   Social Connections: Not on file   Intimate Partner Violence: Not on file   Housing Stability: Not on file       Allergies   Allergen Reactions   • Anesthesia S-I-40 [Propofol] Other (See Comments)     Malignant hypothermia   • Sulfa Antibiotics Other (See Comments)            Objective:    /80 (BP Location: Left arm, Patient Position: Sitting, Cuff Size: Standard)   Pulse 73   Temp 97 8 °F (36 6 °C) (Tympanic)   Resp 16   Ht 5' 10\" (1 778 m)   SpO2 98%   BMI 22 96 kg/m²        Physical Exam  Constitutional:       General: He is not in acute distress  Appearance: He is well-developed  Comments: Wheelchair-bound with severe weakness of his extremities   HENT:      Head: Normocephalic and atraumatic  Nose: Nose normal       Mouth/Throat:      Pharynx: No oropharyngeal exudate  Eyes:      General: No scleral icterus       " Conjunctiva/sclera: Conjunctivae normal       Pupils: Pupils are equal, round, and reactive to light  Neck:      Thyroid: No thyromegaly  Vascular: No JVD  Trachea: No tracheal deviation  Cardiovascular:      Rate and Rhythm: Normal rate and regular rhythm  Heart sounds: Normal heart sounds  No murmur heard  No friction rub  No gallop  Pulmonary:      Effort: Pulmonary effort is normal  No respiratory distress  Breath sounds: No wheezing or rales  Chest:      Chest wall: No tenderness  Abdominal:      General: Bowel sounds are normal  There is no distension  Palpations: Abdomen is soft  There is no mass  Tenderness: There is no abdominal tenderness  There is no guarding or rebound  Musculoskeletal:         General: No deformity  Normal range of motion  Cervical back: Normal range of motion and neck supple  Lymphadenopathy:      Cervical: No cervical adenopathy  Skin:     General: Skin is warm and dry  Findings: No rash  Neurological:      Mental Status: He is alert and oriented to person, place, and time  Cranial Nerves: No cranial nerve deficit  Motor: Weakness present  Coordination: Coordination normal    Psychiatric:         Behavior: Behavior normal          Thought Content:  Thought content normal          Judgment: Judgment normal

## 2023-06-06 ENCOUNTER — APPOINTMENT (OUTPATIENT)
Dept: LAB | Facility: IMAGING CENTER | Age: 53
End: 2023-06-06
Payer: MEDICARE

## 2023-06-06 DIAGNOSIS — M80.80XG OTHER OSTEOPOROSIS WITH CURRENT PATHOLOGICAL FRACTURE WITH DELAYED HEALING, SUBSEQUENT ENCOUNTER: ICD-10-CM

## 2023-06-06 DIAGNOSIS — E29.1 HYPOGONADISM IN MALE: ICD-10-CM

## 2023-06-06 DIAGNOSIS — Z12.11 COLON CANCER SCREENING: ICD-10-CM

## 2023-06-06 DIAGNOSIS — Z00.00 MEDICARE ANNUAL WELLNESS VISIT, SUBSEQUENT: ICD-10-CM

## 2023-06-06 DIAGNOSIS — G71.01 BECKER'S MUSCULAR DYSTROPHY (HCC): Chronic | ICD-10-CM

## 2023-06-06 LAB
ALBUMIN SERPL BCP-MCNC: 3.8 G/DL (ref 3.5–5)
ALP SERPL-CCNC: 100 U/L (ref 46–116)
ALT SERPL W P-5'-P-CCNC: 53 U/L (ref 12–78)
ANION GAP SERPL CALCULATED.3IONS-SCNC: 4 MMOL/L (ref 4–13)
AST SERPL W P-5'-P-CCNC: 41 U/L (ref 5–45)
BASOPHILS # BLD AUTO: 0.04 THOUSANDS/ÂΜL (ref 0–0.1)
BASOPHILS NFR BLD AUTO: 1 % (ref 0–1)
BILIRUB SERPL-MCNC: 0.69 MG/DL (ref 0.2–1)
BUN SERPL-MCNC: 13 MG/DL (ref 5–25)
CALCIUM SERPL-MCNC: 9.3 MG/DL (ref 8.3–10.1)
CHLORIDE SERPL-SCNC: 109 MMOL/L (ref 96–108)
CO2 SERPL-SCNC: 25 MMOL/L (ref 21–32)
CREAT SERPL-MCNC: 0.27 MG/DL (ref 0.6–1.3)
EOSINOPHIL # BLD AUTO: 0.09 THOUSAND/ÂΜL (ref 0–0.61)
EOSINOPHIL NFR BLD AUTO: 3 % (ref 0–6)
ERYTHROCYTE [DISTWIDTH] IN BLOOD BY AUTOMATED COUNT: 12.4 % (ref 11.6–15.1)
ESTRADIOL SERPL-MCNC: 38.8 PG/ML
GFR SERPL CREATININE-BSD FRML MDRD: 159 ML/MIN/1.73SQ M
GLUCOSE P FAST SERPL-MCNC: 87 MG/DL (ref 65–99)
HCT VFR BLD AUTO: 42.6 % (ref 36.5–49.3)
HGB BLD-MCNC: 14.6 G/DL (ref 12–17)
IMM GRANULOCYTES # BLD AUTO: 0.01 THOUSAND/UL (ref 0–0.2)
IMM GRANULOCYTES NFR BLD AUTO: 0 % (ref 0–2)
LYMPHOCYTES # BLD AUTO: 1.23 THOUSANDS/ÂΜL (ref 0.6–4.47)
LYMPHOCYTES NFR BLD AUTO: 37 % (ref 14–44)
MCH RBC QN AUTO: 32.2 PG (ref 26.8–34.3)
MCHC RBC AUTO-ENTMCNC: 34.3 G/DL (ref 31.4–37.4)
MCV RBC AUTO: 94 FL (ref 82–98)
MONOCYTES # BLD AUTO: 0.28 THOUSAND/ÂΜL (ref 0.17–1.22)
MONOCYTES NFR BLD AUTO: 8 % (ref 4–12)
NEUTROPHILS # BLD AUTO: 1.7 THOUSANDS/ÂΜL (ref 1.85–7.62)
NEUTS SEG NFR BLD AUTO: 51 % (ref 43–75)
NRBC BLD AUTO-RTO: 0 /100 WBCS
PLATELET # BLD AUTO: 242 THOUSANDS/UL (ref 149–390)
PMV BLD AUTO: 10.8 FL (ref 8.9–12.7)
POTASSIUM SERPL-SCNC: 3.9 MMOL/L (ref 3.5–5.3)
PROT SERPL-MCNC: 7.5 G/DL (ref 6.4–8.4)
RBC # BLD AUTO: 4.54 MILLION/UL (ref 3.88–5.62)
SODIUM SERPL-SCNC: 138 MMOL/L (ref 135–147)
WBC # BLD AUTO: 3.35 THOUSAND/UL (ref 4.31–10.16)

## 2023-06-06 PROCEDURE — 85025 COMPLETE CBC W/AUTO DIFF WBC: CPT

## 2023-06-06 PROCEDURE — 82670 ASSAY OF TOTAL ESTRADIOL: CPT

## 2023-06-06 PROCEDURE — 84402 ASSAY OF FREE TESTOSTERONE: CPT

## 2023-06-06 PROCEDURE — 84403 ASSAY OF TOTAL TESTOSTERONE: CPT

## 2023-06-06 PROCEDURE — 80053 COMPREHEN METABOLIC PANEL: CPT

## 2023-06-07 ENCOUNTER — TELEPHONE (OUTPATIENT)
Dept: CARDIOLOGY CLINIC | Facility: CLINIC | Age: 53
End: 2023-06-07

## 2023-06-07 LAB
SHBG SERPL-SCNC: 29.5 NMOL/L (ref 19.3–76.4)
TESTOST FREE SERPL-MCNC: 5 PG/ML (ref 7.2–24)
TESTOST SERPL-MCNC: 250 NG/DL (ref 264–916)

## 2023-06-09 ENCOUNTER — PATIENT MESSAGE (OUTPATIENT)
Dept: ENDOCRINOLOGY | Facility: CLINIC | Age: 53
End: 2023-06-09

## 2023-06-09 DIAGNOSIS — E29.1 HYPOGONADISM IN MALE: Primary | ICD-10-CM

## 2023-06-13 DIAGNOSIS — E29.1 HYPOGONADISM IN MALE: ICD-10-CM

## 2023-06-14 ENCOUNTER — TELEPHONE (OUTPATIENT)
Dept: ENDOCRINOLOGY | Facility: CLINIC | Age: 53
End: 2023-06-14

## 2023-06-14 NOTE — TELEPHONE ENCOUNTER
Pt said his ins co is not covering the clomiphene, and he wants to know if Dr Correa's office will be working with the ins to get a prior authorization, or medical necessity  Pt is not sure what is required by his ins and is asking someone to review this and let him know the status  Prescribing Provider Encounter Provider   MD Asif Espinal MD     Outpatient Medication Detail     Disp Refills Start End    clomiPHENE (CLOMID) 50 mg tablet 60 tablet 0 6/13/2023     Sig - Route:  Take 1 tablet (50 mg total) by mouth every other day - Oral    Class: Print      Associated Diagnoses    Hypogonadism in male

## 2023-07-17 NOTE — TELEPHONE ENCOUNTER
Pt called and he was at test   I let him know pre previous encounter ins stated no auth needed  (M6) obeys commands

## 2023-07-18 DIAGNOSIS — E29.1 HYPOGONADISM IN MALE: ICD-10-CM

## 2023-07-25 ENCOUNTER — TELEMEDICINE (OUTPATIENT)
Dept: FAMILY MEDICINE CLINIC | Facility: CLINIC | Age: 53
End: 2023-07-25
Payer: MEDICARE

## 2023-07-25 DIAGNOSIS — J06.9 ACUTE UPPER RESPIRATORY INFECTION: Primary | ICD-10-CM

## 2023-07-25 PROCEDURE — 99213 OFFICE O/P EST LOW 20 MIN: CPT | Performed by: FAMILY MEDICINE

## 2023-07-25 RX ORDER — AZITHROMYCIN 250 MG/1
TABLET, FILM COATED ORAL
Qty: 6 TABLET | Refills: 0 | Status: SHIPPED | OUTPATIENT
Start: 2023-07-25 | End: 2023-07-26 | Stop reason: SDUPTHER

## 2023-07-26 DIAGNOSIS — J06.9 ACUTE UPPER RESPIRATORY INFECTION: ICD-10-CM

## 2023-07-26 RX ORDER — AZITHROMYCIN 250 MG/1
TABLET, FILM COATED ORAL
Qty: 1 TABLET | Refills: 0 | Status: SHIPPED | OUTPATIENT
Start: 2023-07-26 | End: 2023-07-27

## 2023-07-27 NOTE — PROGRESS NOTES
Virtual Brief Visit    This Visit is being completed by telephone. The Patient is located at Home and in the following state in which I hold an active license [place]    The patient was identified by name and date of birth. Leeanne Ramey was informed that this is a telemedicine visit and that the visit is being conducted through the [employer] 3[employer]. He agrees to proceed. .  My office door was closed. No one else was in the room. He acknowledged consent and understanding of privacy and security of the video platform. The patient has agreed to participate and understands they can discontinue the visit at any time. Patient is aware this is a billable service. Assessment/Plan:    Problem List Items Addressed This Visit    None  Visit Diagnoses     Acute upper respiratory infection    -  Primary        46 y/o male with: Acute URI. Discussed supportive care and return parameters. Will give script for Z-pack in case not improving or worsening    Recent Visits  Date Type Provider Dept   07/25/23 Telemedicine Yannick Tabares MD Pg  Primary [facility]   Showing recent visits within past 7 days and meeting all other requirements  Future Appointments  No visits were found meeting these conditions. Showing future appointments within next 150 days and meeting all other requirements     It was my intent to perform this visit via video technology but the patient was not able to do a video connection so the visit was completed via audio telephone only.     Patient is a 46 y/o male who presents c/o cough congestion and sinus pressure over the past several days no fevers chills nausea or vomiting

## 2023-09-27 ENCOUNTER — TELEPHONE (OUTPATIENT)
Dept: NEUROLOGY | Facility: CLINIC | Age: 53
End: 2023-09-27

## 2023-09-27 NOTE — TELEPHONE ENCOUNTER
Pt called in to schedule a new patient appt- previous neurologist retired. Triage completed and sent .

## 2023-10-02 ENCOUNTER — TELEPHONE (OUTPATIENT)
Dept: NEUROLOGY | Facility: CLINIC | Age: 53
End: 2023-10-02

## 2023-11-06 RX ORDER — AZITHROMYCIN 250 MG/1
TABLET, FILM COATED ORAL
COMMUNITY

## 2023-11-06 RX ORDER — FLUTICASONE PROPIONATE AND SALMETEROL XINAFOATE 230; 21 UG/1; UG/1
2 AEROSOL, METERED RESPIRATORY (INHALATION) 2 TIMES DAILY
COMMUNITY

## 2023-11-06 RX ORDER — MELOXICAM 7.5 MG/1
1 TABLET ORAL DAILY
COMMUNITY

## 2023-11-06 RX ORDER — LOTEPREDNOL ETABONATE 5 MG/ML
SUSPENSION/ DROPS OPHTHALMIC
COMMUNITY

## 2023-11-07 ENCOUNTER — OFFICE VISIT (OUTPATIENT)
Dept: FAMILY MEDICINE CLINIC | Facility: CLINIC | Age: 53
End: 2023-11-07
Payer: MEDICARE

## 2023-11-07 VITALS
DIASTOLIC BLOOD PRESSURE: 78 MMHG | SYSTOLIC BLOOD PRESSURE: 120 MMHG | OXYGEN SATURATION: 97 % | HEIGHT: 70 IN | BODY MASS INDEX: 22.96 KG/M2 | TEMPERATURE: 98.6 F | RESPIRATION RATE: 16 BRPM | HEART RATE: 68 BPM

## 2023-11-07 DIAGNOSIS — J06.9 ACUTE UPPER RESPIRATORY INFECTION: Primary | ICD-10-CM

## 2023-11-07 PROCEDURE — 99213 OFFICE O/P EST LOW 20 MIN: CPT | Performed by: FAMILY MEDICINE

## 2023-11-07 RX ORDER — AZITHROMYCIN 250 MG/1
TABLET, FILM COATED ORAL
Qty: 6 TABLET | Refills: 0 | Status: SHIPPED | OUTPATIENT
Start: 2023-11-07 | End: 2023-11-12

## 2023-11-09 DIAGNOSIS — M81.0 OSTEOPOROSIS, UNSPECIFIED OSTEOPOROSIS TYPE, UNSPECIFIED PATHOLOGICAL FRACTURE PRESENCE: ICD-10-CM

## 2023-11-09 DIAGNOSIS — E29.1 HYPOGONADISM IN MALE: Primary | ICD-10-CM

## 2023-11-10 NOTE — PROGRESS NOTES
Assessment/Plan:    19-year-old male with: Acute URI discussed working return parameters we will give Z-Stiven in case not improving or worsening    No problem-specific Assessment & Plan notes found for this encounter. Diagnoses and all orders for this visit:    Acute upper respiratory infection  -     azithromycin (Zithromax) 250 mg tablet; Take 2 tablets (500 mg total) by mouth daily for 1 day, THEN 1 tablet (250 mg total) daily for 4 days. Other orders  -     azithromycin (ZITHROMAX) 250 mg tablet; TAKE ONE TABLET BY MOUTH FOR ONE DAY (Patient not taking: Reported on 11/7/2023)  -     ciclopirox (LOPROX) 0.77 % cream; apply TO TO RASH ON foot and between [address] (Patient not taking: Reported on 11/7/2023)  -     fluticasone-salmeterol (Advair HFA) 230-21 MCG/ACT inhaler; Inhale 2 puffs 2 (two) times a day (Patient not taking: Reported on 11/7/2023)  -     loteprednol etabonate (Lotemax) 0.5 % ophthalmic suspension; INSTILL ONE DROP IN [facility] EYE THREE TIMES DAILY  -     meloxicam (MOBIC) 7.5 mg tablet; Take 1 tablet by mouth daily (Patient not taking: Reported on 11/7/2023)          Subjective:     Chief Complaint   Patient presents with    Follow-up      Patient. No questions or concerns. JT        Patient ID: Deepthi Mclaughlin is a 46 y.o. male. Patient is a 19-year-old male who presents complaining of upper respiratory symptoms for the past several days with cough congestion sinus pressure no fevers chills nausea vomiting        The following portions of the patient's history were reviewed and updated as appropriate: allergies, current medications, past family history, past medical history, past social history, past surgical history and problem list.    Review of Systems   Constitutional: Negative. HENT:  Positive for congestion and sinus pressure. Eyes: Negative. Respiratory:  Positive for cough. Cardiovascular: Negative. Gastrointestinal: Negative. Endocrine: Negative. Genitourinary: Negative. Musculoskeletal: Negative. Allergic/Immunologic: Negative. Neurological: Negative. Hematological: Negative. Psychiatric/Behavioral: Negative. All other systems reviewed and are negative. Objective:      /78 (BP Location: Left arm, Patient Position: Sitting, Cuff Size: Standard)   Pulse 68   Temp 98.6 °F (37 °C) (Tympanic)   Resp 16   Ht 5' 10" (1.778 m)   SpO2 97%   BMI 22.96 kg/m²          Physical Exam  Constitutional:       Appearance: He is well-developed. HENT:      Head: Atraumatic. Right Ear: External ear normal.      Left Ear: External ear normal.   Eyes:      Conjunctiva/sclera: Conjunctivae normal.      Pupils: Pupils are equal, round, and reactive to light. Cardiovascular:      Rate and Rhythm: Normal rate and regular rhythm. Heart sounds: Normal heart sounds. Pulmonary:      Effort: Pulmonary effort is normal. No respiratory distress. Breath sounds: Normal breath sounds. Abdominal:      General: There is no distension. Palpations: Abdomen is soft. Tenderness: There is no abdominal tenderness. There is no guarding or rebound. Musculoskeletal:         General: Normal range of motion. Cervical back: Normal range of motion. Skin:     General: Skin is warm and dry. Neurological:      Mental Status: He is alert and oriented to person, place, and time. Cranial Nerves: No cranial nerve deficit. Psychiatric:         Behavior: Behavior normal.         Thought Content:  Thought content normal.         Judgment: Judgment normal.

## 2023-11-28 ENCOUNTER — TELEPHONE (OUTPATIENT)
Age: 53
End: 2023-11-28

## 2023-11-28 NOTE — TELEPHONE ENCOUNTER
Patient was returning a call from the office to speak with Tasia Preston to have his dexa scan scheduled.

## 2023-11-29 ENCOUNTER — TELEPHONE (OUTPATIENT)
Dept: NEUROLOGY | Facility: CLINIC | Age: 53
End: 2023-11-29

## 2023-11-29 NOTE — TELEPHONE ENCOUNTER
LMOM to confirm pt's appt w/ Dr. Elena Jefferson on 12/7/23 at 8. Advised pt to arrive 15 minutes prior to check in with the .

## 2023-11-30 ENCOUNTER — TELEPHONE (OUTPATIENT)
Dept: NEUROLOGY | Facility: CLINIC | Age: 53
End: 2023-11-30

## 2023-11-30 NOTE — TELEPHONE ENCOUNTER
Patient called verifying that records from [address] were faxed and uploaded into chart. Verified records are in patient chart.

## 2023-12-06 DIAGNOSIS — G71.01 BECKER'S MUSCULAR DYSTROPHY (HCC): Primary | ICD-10-CM

## 2023-12-07 ENCOUNTER — OFFICE VISIT (OUTPATIENT)
Dept: NEUROLOGY | Facility: CLINIC | Age: 53
End: 2023-12-07
Payer: MEDICARE

## 2023-12-07 ENCOUNTER — TELEPHONE (OUTPATIENT)
Dept: NEUROLOGY | Facility: CLINIC | Age: 53
End: 2023-12-07

## 2023-12-07 VITALS
OXYGEN SATURATION: 98 % | WEIGHT: 151 LBS | DIASTOLIC BLOOD PRESSURE: 78 MMHG | BODY MASS INDEX: 21.62 KG/M2 | TEMPERATURE: 98.3 F | HEART RATE: 78 BPM | SYSTOLIC BLOOD PRESSURE: 120 MMHG | HEIGHT: 70 IN

## 2023-12-07 DIAGNOSIS — G71.01 BECKER'S MUSCULAR DYSTROPHY (HCC): ICD-10-CM

## 2023-12-07 PROCEDURE — 99204 OFFICE O/P NEW MOD 45 MIN: CPT | Performed by: PSYCHIATRY & NEUROLOGY

## 2023-12-07 NOTE — ASSESSMENT & PLAN NOTE
This patient has Suazo's muscular dystrophy, diagnosed in his teen years, has been gradually progressive since then. At this time, he has limited function in his lower extremities, and has weakness in the upper extremities, relies completely on his power wheelchair. He is able to drive, does work at [employer] over [employer]. Continues to participate in physical therapy few times a week, which was encouraged today. He has had EKGs and PFTs more than 2 years ago, ordered these 2 tests that came to keep a baseline to look for complications from Suazo's. His power wheelchair is about 9years old, he did get an evaluation at [facility] in the wheelchair clinic, I will write a letter for him as a face-to-face for power mobility. Had a DEXA scan earlier this year, takes calcium and vitamin D, we did discuss the role of medications like Fosamax/Prolia and encouraged him to follow this with his primary care physician. Does follow with endocrinology as well. He will return to see me in 8 to 9 months, has my contact information for any questions or concerns in the interim.

## 2023-12-07 NOTE — PROGRESS NOTES
Patient ID: Pippa Pate is a 46 y.o. male. Assessment/Plan:    Suazo's muscular dystrophy ([address] )  This patient has Suazo's muscular dystrophy, diagnosed in his teen years, has been gradually progressive since then. At this time, he has limited function in his lower extremities, and has weakness in the upper extremities, relies completely on his power wheelchair. He is able to drive, does work at [employer] over [employer]. Continues to participate in physical therapy few times a week, which was encouraged today. He has had EKGs and PFTs more than 2 years ago, ordered these 2 tests that came to keep a baseline to look for complications from Suazo's. His power wheelchair is about 9years old, he did get an evaluation at [facility] in the wheelchair clinic, I will write a letter for him as a face-to-face for power mobility. Had a DEXA scan earlier this year, takes calcium and vitamin D, we did discuss the role of medications like Fosamax/Prolia and encouraged him to follow this with his primary care physician. Does follow with endocrinology as well. He will return to see me in 8 to 9 months, has my contact information for any questions or concerns in the interim. Diagnoses and all orders for this visit:    Suazo's muscular dystrophy ([address] )  -     Ambulatory Referral to Neurology  -     ECG 12 lead; Future  -     Complete PFT; Future           Subjective:    HPI    I had the pleasure of seeing your patient in neurology clinic for neuromuscular consultation. As you know, patient is a 50-year-old man with Suazo's muscular dystrophy, patient has been followed by Dr. Stephani Newton at [facility] in the [address] [place] [address], he is here to establish care with us. He was accompanied by his parents today. He started having symptoms when he was in high school, in 9th grade (1987), he had a few falls in gym.   [facility], had a muscle biopsy and was diagnosed with Suazo's muscular dystrophy. He has been following with Dr. Janice Rodriguez since then. He has had slow and gradual decline since then, he was skiing up until 2000. He has worked in [employer], Still works at [employer] in summer. 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 In year 2000, he started relying on power chair, current power chair is 9years old, needs replacement. At home, he uses the power chair, sit to stand lift, Jack lift, has ramps to get in and out of the house. Can take the power chair in the bathroom, has a self made "shower chair", able to do most ADL's by himself, may need help here and there. NO issues with swallowing, Speech is normal.     Breathing has been good, does not get out of breath with activity or ADL's. No Sob at night, feels well rested when he wakes up in am. He sleeps late at night, usually watching tv, wakes up around 9 am, no excessive tendency to fall asleep. No snoring per family. Last EKG was a few years ago, reported sinus bradycardia, no records available for my review. PFTs but at least a few years ago, reported normal, report not available for my review. He usually goes to the Gym at [facility] 2-3 times a week, stands in the standing frame, weights for the arms. No muscles spasms or cramps, no pain, no back pain. He is able to drive, can get in and out of the car, its a van. Has been taking OTC supplements, calcium, mag, creatine. He has been evaluated in wheel chair clinic at [facility]    NO known family hx of muscle dystrophy.      The following portions of the patient's history were reviewed and updated as appropriate: allergies, current medications, past family history, past medical history, past social history, past surgical history, and problem list.         Objective:    Blood pressure 120/78, pulse 78, temperature 98.3 °F (36.8 °C), temperature source Temporal, height 5' 10" (1.778 m), weight 68.5 kg (151 lb), SpO2 98 %. Physical Exam  On general examination, he was not in any acute distress. He was sitting comfortably in his power wheelchair. HEENT was unremarkable. Extremities did not reveal any edema. Neurological Exam  Neurologically, patient was awake and alert. Speech was fluent without any dysarthria. Cranial nerve examination did not reveal any ptosis at baseline, with sustained upward gaze. Normal extraocular movements. Normal strength of facial muscles. No facial asymmetry or weakness, tongue was midline and normal.  On motor examination, strength was graded as 5 in the neck extensors, neck flexors were graded as 4-4+. He has bilateral scapular winging, deltoids were graded as 3+/5, cannot fully raise his arms above the shoulders. Bicep and triceps were 2/5, wrist extensors were 3+ on the right, 3 on the left, wrist flexors were 4, interossei were 3 bilaterally. In the lower extremities, iliopsoas was 1, quadriceps and hamstrings were 2-3/5, plantar flexors were 4, dorsiflexors were hard to assess because he has bilateral Achilles contractures. He also has tightening of the hamstring muscles. Sensation was normal to vibration, light touch, and temperature. Gait was not checked today, as patient is nonambulatory. ROS:  I reviewed the below ROS and what is mentioned in HPI, the remainder of ROS was negative. Review of Systems   Constitutional:  Negative for appetite change, fatigue and fever. HENT: Negative. Negative for hearing loss, tinnitus, trouble swallowing and voice change. Eyes: Negative. Negative for photophobia, pain and visual disturbance. Respiratory: Negative. Negative for shortness of breath. Cardiovascular: Negative. Negative for palpitations. Gastrointestinal: Negative. Negative for nausea and vomiting. Endocrine: Negative. Negative for cold intolerance. Genitourinary: Negative. Negative for dysuria, frequency and urgency. Musculoskeletal:  Negative for back pain, gait problem, myalgias and neck pain. Skin: Negative. Negative for rash. Allergic/Immunologic: Negative. Neurological: Negative. Negative for dizziness, tremors, seizures, syncope, facial asymmetry, speech difficulty, weakness, light-headedness, numbness and headaches. Hematological: Negative. Does not bruise/bleed easily. Psychiatric/Behavioral: Negative. Negative for confusion, hallucinations and sleep disturbance. All other systems reviewed and are negative.

## 2023-12-07 NOTE — TELEPHONE ENCOUNTER
Faxed over face to face letter to [facility] and Mobility from Dr. Bev Forbes along with old letter from Dr. Beatriz Delong.

## 2023-12-07 NOTE — LETTER
To whom it may concern. Re: Daniela Diehl  : 1970    I am writing this letter for Mr. Daniela Diehl, who was seen in my clinic today at [facility]. I am writing this letter as a face-to-face encounter for power wheelchair. This patient has Suazo's muscular dystrophy, he requires a power wheelchair for activities of daily living as well as mobilization within the community. His prognosis is fair, his examination reveals significant weakness in his lower extremities graded as 1-2/5, significant weakness in the upper extremities graded as 3/5, making him unable to use his arms for manual wheelchair mobility. At this point, he is unable to ambulate, and currently using a power wheelchair, which is 9years old and needs to be replaced. He has the mental capacity to use the power wheelchair. Please do not hesitate to contact me if you need any additional information to support this patient.       Sincerely,      Kellie Tillman MD

## 2023-12-13 ENCOUNTER — APPOINTMENT (OUTPATIENT)
Dept: LAB | Facility: IMAGING CENTER | Age: 53
End: 2023-12-13
Payer: MEDICARE

## 2023-12-13 DIAGNOSIS — E29.1 HYPOGONADISM IN MALE: ICD-10-CM

## 2023-12-13 LAB — ESTRADIOL SERPL-MCNC: 19.5 PG/ML

## 2023-12-13 PROCEDURE — 84403 ASSAY OF TOTAL TESTOSTERONE: CPT

## 2023-12-13 PROCEDURE — 82670 ASSAY OF TOTAL ESTRADIOL: CPT

## 2023-12-13 PROCEDURE — 84402 ASSAY OF FREE TESTOSTERONE: CPT

## 2023-12-14 LAB — SHBG SERPL-SCNC: 32.2 NMOL/L (ref 19.3–76.4)

## 2023-12-15 LAB
TESTOST FREE SERPL-MCNC: 4.7 PG/ML (ref 7.2–24)
TESTOST SERPL-MCNC: 276 NG/DL (ref 264–916)

## 2024-01-30 ENCOUNTER — TELEPHONE (OUTPATIENT)
Dept: NEUROLOGY | Facility: CLINIC | Age: 54
End: 2024-01-30

## 2024-01-30 NOTE — TELEPHONE ENCOUNTER
Norberto recmanolo 1/26/24 4:49pm  A hi, my name is A hiren Hicks. I see Kevin Blue. The reason why I'm calling is I wanted to try to talk to someone at that office but I couldn't get anybody. I wanted to discuss that with someone at the office to see if how fast I could make an appointment back in for something that's kind of important. So you can give me a call back at [phone]. Adamaris.   ____________________________________________    Pt requesting appt for important issue. No details provided.

## 2024-02-01 DIAGNOSIS — G71.01 BECKER'S MUSCULAR DYSTROPHY (HCC): Primary | ICD-10-CM

## 2024-02-05 DIAGNOSIS — E29.1 HYPOGONADISM IN MALE: Primary | ICD-10-CM

## 2024-02-05 RX ORDER — TESTOSTERONE 30 MG/1.5ML
30 SOLUTION TOPICAL DAILY
Qty: 90 ML | Refills: 2 | Status: SHIPPED | OUTPATIENT
Start: 2024-02-05

## 2024-03-08 ENCOUNTER — TELEPHONE (OUTPATIENT)
Dept: ENDOCRINOLOGY | Facility: CLINIC | Age: 54
End: 2024-03-08

## 2024-03-08 NOTE — TELEPHONE ENCOUNTER
Patient called [facility] requesting a call back to discuss prescription from Dr. Correa. Patient is not sure pharmacy is going to cover and would like to discuss.

## 2024-03-12 NOTE — TELEPHONE ENCOUNTER
Returned call to patient. Patient states insurance requested information from pharmacy. Prior authorization completed for Testosterone via cover my meds, key [id number]. Determination pending.

## 2024-04-02 ENCOUNTER — HOSPITAL ENCOUNTER (OUTPATIENT)
Dept: BONE DENSITY | Facility: IMAGING CENTER | Age: 54
Discharge: HOME | End: 2024-04-02
Payer: COMMERCIAL

## 2024-04-02 VITALS — HEIGHT: 70 IN | BODY MASS INDEX: 22.9 KG/M2 | WEIGHT: 160 LBS

## 2024-04-02 DIAGNOSIS — M81.0 OSTEOPOROSIS, UNSPECIFIED OSTEOPOROSIS TYPE, UNSPECIFIED PATHOLOGICAL FRACTURE PRESENCE: ICD-10-CM

## 2024-04-02 PROCEDURE — 77080 DXA BONE DENSITY AXIAL: CPT

## 2024-04-12 ENCOUNTER — TELEPHONE (OUTPATIENT)
Dept: NEUROLOGY | Facility: CLINIC | Age: 54
End: 2024-04-12

## 2024-04-12 NOTE — TELEPHONE ENCOUNTER
Patient was seen at  for wheelchair modifications.    They are requesting that a script for that be sent over.    Fax: [phone]

## 2024-04-15 DIAGNOSIS — G71.01 BECKER'S MUSCULAR DYSTROPHY (HCC): Primary | ICD-10-CM

## 2024-04-18 ENCOUNTER — TELEPHONE (OUTPATIENT)
Dept: NEUROLOGY | Facility: CLINIC | Age: 54
End: 2024-04-18

## 2024-04-23 ENCOUNTER — TELEMEDICINE (OUTPATIENT)
Dept: ENDOCRINOLOGY | Facility: CLINIC | Age: 54
End: 2024-04-23
Payer: MEDICARE

## 2024-04-23 ENCOUNTER — PATIENT MESSAGE (OUTPATIENT)
Dept: ENDOCRINOLOGY | Facility: CLINIC | Age: 54
End: 2024-04-23

## 2024-04-23 DIAGNOSIS — M81.8 OTHER OSTEOPOROSIS WITHOUT CURRENT PATHOLOGICAL FRACTURE: ICD-10-CM

## 2024-04-23 DIAGNOSIS — E55.9 VITAMIN D DEFICIENCY: ICD-10-CM

## 2024-04-23 DIAGNOSIS — G71.01 BECKER'S MUSCULAR DYSTROPHY (HCC): Chronic | ICD-10-CM

## 2024-04-23 DIAGNOSIS — E29.1 HYPOGONADISM IN MALE: Primary | ICD-10-CM

## 2024-04-23 PROCEDURE — G2211 COMPLEX E/M VISIT ADD ON: HCPCS | Performed by: INTERNAL MEDICINE

## 2024-04-23 PROCEDURE — 99214 OFFICE O/P EST MOD 30 MIN: CPT | Performed by: INTERNAL MEDICINE

## 2024-04-23 RX ORDER — ZOLEDRONIC ACID 5 MG/100ML
5 INJECTION, SOLUTION INTRAVENOUS ONCE
OUTPATIENT
Start: 2024-04-23

## 2024-04-23 RX ORDER — SODIUM CHLORIDE 9 MG/ML
20 INJECTION, SOLUTION INTRAVENOUS ONCE
OUTPATIENT
Start: 2024-04-23

## 2024-04-23 RX ORDER — TESTOSTERONE 20.25 MG/1.25G
20.25 GEL TOPICAL DAILY
Qty: 30 PACKET | Refills: 5 | Status: SHIPPED | OUTPATIENT
Start: 2024-04-23

## 2024-04-23 NOTE — PROGRESS NOTES
REQUIRED DOCUMENTATION:      1. This service was provided via Telemedicine -[email or web address]  2. Provider located at [place], [place].  3. TeleMed provider: Nel Correa MD.  4. Identify all parties in room with patient during tele consult:  5.Patient was then informed that this was a Telemedicine visit and that the exam was being conducted confidentially over secure lines. My office door was closed. No one else was in the room.  Patient acknowledged consent and understanding of privacy and security of the Telemedicine visit, and gave us permission to have the assistant stay in the room in order to assist with the history and to conduct the exam.  I informed the patient that I have reviewed their record in Epic and presented the opportunity for them to ask any questions regarding the visit today.  The patient agreed to participate.     Patient is aware this is a billable service.       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 Last visit was 4/25/23.     Since last visit, he feels well. No complaints. He has improved physical activity significantly and feels better.     Follows Dr. Ramírez for muscular dystrophy.     DXA 3/9/22: tscores 0.8LS, -3.3 LTH and -3.0 Lt femoral neck. 10 yr frax scores 8% for hip and 17% for major osteoporotic #.  5/18/23 DXA: T scores 0.8LS, -2.7 LTH, -2.8 LFN. STATISTICALLY SIGNIFICANT INCREASE in bone mineral density of 0.087 g/cm2 (16.2%) in the left total hip. 10 yr FRAX score 4.9% hip and 15% major osteoporotic fracture.   4/2/24 DXA: Tscores 1.2LS, -2.9LTH, -3.1LFN. 10 yr FRAX score is 8% hip and 18% major osteoporotic fracture.    Physical Exam:  There is no height or weight on file to calculate BMI.  There were no vitals taken for this visit.   There were  "no vitals filed for this visit.     Physical Exam  Constitutional:       General: He is not in acute distress.     Appearance: He is well-developed.   HENT:      Head: Normocephalic and atraumatic.      Nose: Nose normal.   Eyes:      Conjunctiva/sclera: Conjunctivae normal.   Pulmonary:      Effort: Pulmonary effort is normal.   Abdominal:      General: There is no distension.   Musculoskeletal:      Cervical back: Normal range of motion and neck supple.   Skin:     Findings: No rash.      Comments: No icterus   Neurological:      Mental Status: He is alert and oriented to person, place, and time.         Labs:   Lab Results   Component Value Date    HGBA1C 5.2 01/30/2017       Lab Results   Component Value Date    XII1JFGOIRJO 2.220 07/11/2022    D5EFAQG 11.7 01/30/2017       Lab Results   Component Value Date    CREATININE 0.27 (L) 06/06/2023    CREATININE 0.29 (L) 07/11/2022    CREATININE 0.38 (L) 01/10/2022    BUN 13 06/06/2023     10/21/2015    K 3.9 06/06/2023     (H) 06/06/2023    CO2 25 06/06/2023     eGFR   Date Value Ref Range Status   06/06/2023 159 ml/min/1.73sq m Final       Lab Results   Component Value Date    ALT 53 06/06/2023    AST 41 06/06/2023    ALKPHOS 100 06/06/2023    BILITOT 0.28 10/21/2015       Lab Results   Component Value Date    CHOLESTEROL 181 11/08/2017    CHOLESTEROL 196 01/30/2017     Lab Results   Component Value Date    HDL 57 11/08/2017    HDL 58 01/30/2017     Lab Results   Component Value Date    TRIG 101 11/08/2017    TRIG 107 01/30/2017     No results found for: \"NONHDLC\"      Assessment/Plan:    1. Hypogonadism in male  Assessment & Plan:  He has hypogonadotropic hypogonadism.  He responded to testosterone therapy initially but was not approved for transdermal testosterone.    He is unable to use intramuscular testosterone injection due to muscular dystrophy.  We agreed to send Topical testosterone 1.62% preparation.    Repeat labs in a few months   Follow up in " 6 months.    Orders:  -     Estradiol; Future  -     Testosterone, free, total; Future  -     Sex Hormone Binding Globulin; Future  -     testosterone (ANDROGEL) 1.62 %; Apply 1 packet (20.25 mg total) topically daily    2. Other osteoporosis without current pathological fracture  Assessment & Plan:  Recent DXA scan shows deterioration.    We discussed options and agreed to start reclast. Order placed in beacon.advised to schedule at [facility].    Follow up with repeat labs in 6 months.    Orders:  -     Vitamin D 25 hydroxy; Future  -     Phosphorus; Future  -     PTH, intact; Future  -     Comprehensive metabolic panel; Future    3. Vitamin D deficiency  Assessment & Plan:  Take vit d3 2000IU daily OTC      4. Suazo's muscular dystrophy (HCC)          I have spent a total time of 32 minutes on 04/23/24 in caring for this patient including greater than 50% of this time was spent in counseling/coordination of care as listed above.       Discussed with the patient and all questioned fully answered. He will contact me with concerns.    Nel Correa

## 2024-04-23 NOTE — ASSESSMENT & PLAN NOTE
He has hypogonadotropic hypogonadism.  He responded to testosterone therapy initially but was not approved for transdermal testosterone.    He is unable to use intramuscular testosterone injection due to muscular dystrophy.  We agreed to send Topical testosterone 1.62% preparation.    Repeat labs in a few months   Follow up in 6 months.

## 2024-04-23 NOTE — ASSESSMENT & PLAN NOTE
Recent DXA scan shows deterioration.    We discussed options and agreed to start reclast. Order placed in beacon.advised to schedule at [facility].    Follow up with repeat labs in 6 months.

## 2024-04-24 ENCOUNTER — TELEPHONE (OUTPATIENT)
Age: 54
End: 2024-04-24

## 2024-04-24 NOTE — TELEPHONE ENCOUNTER
PA for vi wood Denied    Reason:patient is eligible for Medicar        Message sent to office clinical pool No      Denial letter scanned into Media Yes    Appeal started No ( Provider will need to decide if appeal is warranted and send clinical documentation to PA team for initiation.)

## 2024-04-24 NOTE — TELEPHONE ENCOUNTER
PA for testosterone jel    Submitted via    []CMM-KEY    [][employer]-Case ID # [id number]   []Faxed to plan   []Other website    []Phone call Case ID #      Office notes sent, clinical questions answered. Awaiting determination    Turnaround time for your insurance to make a decision on your Prior Authorization can take 7-21 business days.

## 2024-04-24 NOTE — TELEPHONE ENCOUNTER
PA for testosterone Jel    Submitted via    []CMM-KEY    []Surescripts-Case ID #    []Faxed to plan [phone] performrx  []Other website    []Phone call Case ID #      Office notes sent, clinical questions answered. Awaiting determination    Turnaround time for your insurance to make a decision on your Prior Authorization can take 7-21 business days.

## 2024-04-25 ENCOUNTER — TELEPHONE (OUTPATIENT)
Age: 54
End: 2024-04-25

## 2024-04-25 NOTE — TELEPHONE ENCOUNTER
PA for testosterone jel    Submitted via    []CMM-KEY    [x][employer]-Case ID # [id number]  []Faxed to plan   []Other website    []Phone call Case ID #      Office notes sent, clinical questions answered. Awaiting determination    Turnaround time for your insurance to make a decision on your Prior Authorization can take 7-21 business days.

## 2024-04-26 NOTE — TELEPHONE ENCOUNTER
PA for testosterone Approved     Date(s) approved March 26, 2024 to April 25, 2025   Case #    Patient advised by [x] Zasehart Message                      [] Phone call      []LMOM     []L/M to call office as no active Communication consent on file     []Unable to leave detailed message as VM not approved on Communication consent         Pharmacy advised by [x]Fax                                     []Phone call    Approval letter scanned into Media No

## 2024-05-02 ENCOUNTER — TELEPHONE (OUTPATIENT)
Dept: ENDOCRINOLOGY | Facility: CLINIC | Age: 54
End: 2024-05-02

## 2024-05-02 NOTE — TELEPHONE ENCOUNTER
Called patient to clarify message, lvm informing him that medication was approved and call back number

## 2024-05-22 DIAGNOSIS — E29.1 HYPOGONADISM IN MALE: ICD-10-CM

## 2024-05-22 NOTE — TELEPHONE ENCOUNTER
[facility] doesn't carry this testosterone. Pt says put attn: [facility] on rx    Pt needs the pharmacy changed for his testosterone. Call it into [facility] [address]  androgel

## 2024-05-24 RX ORDER — TESTOSTERONE 20.25 MG/1.25G
20.25 GEL TOPICAL DAILY
Qty: 30 PACKET | Refills: 5 | Status: SHIPPED | OUTPATIENT
Start: 2024-05-24

## 2024-05-26 ENCOUNTER — PATIENT MESSAGE (OUTPATIENT)
Dept: ENDOCRINOLOGY | Facility: CLINIC | Age: 54
End: 2024-05-26

## 2024-05-29 ENCOUNTER — TELEPHONE (OUTPATIENT)
Dept: ENDOCRINOLOGY | Facility: CLINIC | Age: 54
End: 2024-05-29

## 2024-05-29 NOTE — TELEPHONE ENCOUNTER
Returned call to patient to inform him that per provider note reclast (Zoledronic acid) was ordered and can be scheduled for [facility]. Left number for central scheduling.

## 2024-06-05 DIAGNOSIS — M81.8 OTHER OSTEOPOROSIS WITHOUT CURRENT PATHOLOGICAL FRACTURE: Primary | ICD-10-CM

## 2024-06-05 RX ORDER — ZOLEDRONIC ACID 5 MG/100ML
4 INJECTION, SOLUTION INTRAVENOUS ONCE
OUTPATIENT
Start: 2024-06-05

## 2024-06-05 RX ORDER — SODIUM CHLORIDE 9 MG/ML
20 INJECTION, SOLUTION INTRAVENOUS ONCE
OUTPATIENT
Start: 2024-06-05

## 2024-06-05 RX ORDER — ZOLEDRONIC ACID 5 MG/100ML
4 INJECTION, SOLUTION INTRAVENOUS ONCE
Status: CANCELLED | OUTPATIENT
Start: 2024-06-05

## 2024-06-07 ENCOUNTER — TELEPHONE (OUTPATIENT)
Dept: ENDOCRINOLOGY | Facility: CLINIC | Age: 54
End: 2024-06-07

## 2024-06-07 NOTE — TELEPHONE ENCOUNTER
Called patient and left detailed vm informing patient that reclast infusion order was placed and patient can schedule.

## 2024-06-10 ENCOUNTER — TELEPHONE (OUTPATIENT)
Dept: ENDOCRINOLOGY | Facility: CLINIC | Age: 54
End: 2024-06-10

## 2024-06-10 NOTE — TELEPHONE ENCOUNTER
Called patient to review providers message. Patient states that he received message and will schedule infusion soon.

## 2024-06-24 ENCOUNTER — APPOINTMENT (OUTPATIENT)
Dept: LAB | Facility: IMAGING CENTER | Age: 54
End: 2024-06-24
Payer: MEDICARE

## 2024-06-24 DIAGNOSIS — E29.1 HYPOGONADISM IN MALE: ICD-10-CM

## 2024-06-24 DIAGNOSIS — M81.8 OTHER OSTEOPOROSIS WITHOUT CURRENT PATHOLOGICAL FRACTURE: ICD-10-CM

## 2024-06-24 LAB
25(OH)D3 SERPL-MCNC: 49.3 NG/ML (ref 30–100)
ALBUMIN SERPL BCG-MCNC: 4 G/DL (ref 3.5–5)
ALP SERPL-CCNC: 96 U/L (ref 34–104)
ALT SERPL W P-5'-P-CCNC: 26 U/L (ref 7–52)
ANION GAP SERPL CALCULATED.3IONS-SCNC: 10 MMOL/L (ref 4–13)
AST SERPL W P-5'-P-CCNC: 23 U/L (ref 13–39)
BILIRUB SERPL-MCNC: 0.42 MG/DL (ref 0.2–1)
BUN SERPL-MCNC: 17 MG/DL (ref 5–25)
CALCIUM SERPL-MCNC: 9.2 MG/DL (ref 8.4–10.2)
CHLORIDE SERPL-SCNC: 106 MMOL/L (ref 96–108)
CO2 SERPL-SCNC: 23 MMOL/L (ref 21–32)
CREAT SERPL-MCNC: 0.28 MG/DL (ref 0.6–1.3)
ESTRADIOL SERPL-MCNC: 23.5 PG/ML
GFR SERPL CREATININE-BSD FRML MDRD: 155 ML/MIN/1.73SQ M
GLUCOSE P FAST SERPL-MCNC: 94 MG/DL (ref 65–99)
PHOSPHATE SERPL-MCNC: 3.7 MG/DL (ref 2.7–4.5)
POTASSIUM SERPL-SCNC: 4.1 MMOL/L (ref 3.5–5.3)
PROT SERPL-MCNC: 7.1 G/DL (ref 6.4–8.4)
PTH-INTACT SERPL-MCNC: 44.4 PG/ML (ref 12–88)
SODIUM SERPL-SCNC: 139 MMOL/L (ref 135–147)

## 2024-06-24 PROCEDURE — 83970 ASSAY OF PARATHORMONE: CPT

## 2024-06-24 PROCEDURE — 36415 COLL VENOUS BLD VENIPUNCTURE: CPT

## 2024-06-24 PROCEDURE — 84403 ASSAY OF TOTAL TESTOSTERONE: CPT

## 2024-06-24 PROCEDURE — 80053 COMPREHEN METABOLIC PANEL: CPT

## 2024-06-24 PROCEDURE — 82670 ASSAY OF TOTAL ESTRADIOL: CPT

## 2024-06-24 PROCEDURE — 84402 ASSAY OF FREE TESTOSTERONE: CPT

## 2024-06-24 PROCEDURE — 84270 ASSAY OF SEX HORMONE GLOBUL: CPT

## 2024-06-24 PROCEDURE — 84100 ASSAY OF PHOSPHORUS: CPT

## 2024-06-24 PROCEDURE — 82306 VITAMIN D 25 HYDROXY: CPT

## 2024-06-25 LAB
SHBG SERPL-SCNC: 28.2 NMOL/L (ref 19.3–76.4)
TESTOST FREE SERPL-MCNC: 4.2 PG/ML (ref 7.2–24)
TESTOST SERPL-MCNC: 248 NG/DL (ref 264–916)

## 2024-07-02 ENCOUNTER — TELEPHONE (OUTPATIENT)
Dept: INFUSION CENTER | Facility: CLINIC | Age: 54
End: 2024-07-02

## 2024-07-02 NOTE — TELEPHONE ENCOUNTER
Pt called infusion center to inquire about his infusion next week. Discussed location of the infusion center, projected length of appointment, visitor policy, and availability of snacks, drinks, and WiFi. Pt is anxious about his infusion and he is worried about potential side effects, reassurance offered. All questions answered.

## 2024-07-08 ENCOUNTER — HOSPITAL ENCOUNTER (OUTPATIENT)
Dept: INFUSION CENTER | Facility: CLINIC | Age: 54
Discharge: HOME | End: 2024-07-08
Payer: MEDICARE

## 2024-07-08 VITALS
DIASTOLIC BLOOD PRESSURE: 87 MMHG | WEIGHT: 160 LBS | BODY MASS INDEX: 22.96 KG/M2 | SYSTOLIC BLOOD PRESSURE: 137 MMHG | TEMPERATURE: 98.5 F | HEART RATE: 83 BPM | RESPIRATION RATE: 18 BRPM

## 2024-07-08 DIAGNOSIS — M81.8 OTHER OSTEOPOROSIS WITHOUT CURRENT PATHOLOGICAL FRACTURE: Primary | ICD-10-CM

## 2024-07-08 PROCEDURE — 96365 THER/PROPH/DIAG IV INF INIT: CPT

## 2024-07-08 PROCEDURE — 96366 THER/PROPH/DIAG IV INF ADDON: CPT

## 2024-07-08 RX ORDER — SODIUM CHLORIDE 9 MG/ML
20 INJECTION, SOLUTION INTRAVENOUS ONCE
OUTPATIENT
Start: 2024-08-20

## 2024-07-08 RX ORDER — ZOLEDRONIC ACID 5 MG/100ML
4 INJECTION, SOLUTION INTRAVENOUS ONCE
Status: COMPLETED | OUTPATIENT
Start: 2024-07-08 | End: 2024-07-08

## 2024-07-08 RX ORDER — SODIUM CHLORIDE 9 MG/ML
20 INJECTION, SOLUTION INTRAVENOUS ONCE
Status: COMPLETED | OUTPATIENT
Start: 2024-07-08 | End: 2024-07-08

## 2024-07-08 RX ORDER — ZOLEDRONIC ACID 5 MG/100ML
4 INJECTION, SOLUTION INTRAVENOUS ONCE
OUTPATIENT
Start: 2024-08-20

## 2024-07-08 RX ADMIN — SODIUM CHLORIDE 20 ML/HR: 0.9 INJECTION, SOLUTION INTRAVENOUS at 13:11

## 2024-07-08 RX ADMIN — ZOLEDRONIC ACID 4 MG: 0.05 INJECTION, SOLUTION INTRAVENOUS at 13:14

## 2024-07-08 NOTE — PROGRESS NOTES
Pt tolerated treatment today without incident.  Pt declined AVS.  Pt is aware that he needs to see his doctor to have another appt date for next year.

## 2024-07-10 NOTE — PATIENT COMMUNICATION
Patient sent [employer] message asking me to call him. I called him- he had the infusion on Monday, and started feeling the side effects today. He said he feels somewhat dehydrated and has bone aches. He knows these are common side effects, but waned to let Dr. Correa know.    He would like to know if he could be given the infusion in the hospital next year and kept for 3 days? He isn't sure if they do that, but thought it was worth asking. Please advise.

## 2024-07-15 ENCOUNTER — TELEPHONE (OUTPATIENT)
Age: 54
End: 2024-07-15

## 2024-07-15 NOTE — TELEPHONE ENCOUNTER
Inbound call received from Patient requesting the faxed form from [facility] received on 7/10/2024 be completed.     Ursula Bentley MA:   Please completed form and per cover page, please fax to [phone]. Thank you!    If there are questions regarding form please call: [phone], Sharlene Cárdenas,  at [facility].

## 2024-08-06 ENCOUNTER — OFFICE VISIT (OUTPATIENT)
Dept: NEUROLOGY | Facility: CLINIC | Age: 54
End: 2024-08-06
Payer: MEDICARE

## 2024-08-06 VITALS
BODY MASS INDEX: 21.47 KG/M2 | DIASTOLIC BLOOD PRESSURE: 78 MMHG | TEMPERATURE: 98.5 F | OXYGEN SATURATION: 98 % | SYSTOLIC BLOOD PRESSURE: 142 MMHG | HEART RATE: 88 BPM | WEIGHT: 150 LBS | HEIGHT: 70 IN

## 2024-08-06 DIAGNOSIS — G71.01 BECKER'S MUSCULAR DYSTROPHY (HCC): Primary | Chronic | ICD-10-CM

## 2024-08-06 PROCEDURE — 99214 OFFICE O/P EST MOD 30 MIN: CPT | Performed by: PSYCHIATRY & NEUROLOGY

## 2024-08-06 PROCEDURE — G2211 COMPLEX E/M VISIT ADD ON: HCPCS | Performed by: PSYCHIATRY & NEUROLOGY

## 2024-08-06 RX ORDER — GINSENG 100 MG
CAPSULE ORAL
COMMUNITY

## 2024-08-06 NOTE — ASSESSMENT & PLAN NOTE
Stable findings on exam. No significant worsening on exam. Patient wishes to hold off on going back to PT at this time. He is going to the gym 3x a week and working on stretches. Still able to complete his ADLs. At this time, he is in need of a new mobile chair. No additional work up required at this time. Would recommend EKG and PFTs to establish baseline.    PT referral when patient would like to restart  Face to face for new chair  Follow up in 6 months

## 2024-08-06 NOTE — PROGRESS NOTES
Ambulatory Visit  Name: Mehdi Hicks      : 1970      MRN: [id number]  Encounter Provider: Kevin Blue MD  Encounter Date: 2024   Encounter department: NEUROLOGY [facility]    Assessment & Plan   1. Suazo's muscular dystrophy (HCC)  Assessment & Plan:  Stable findings on exam. No significant worsening on exam. Patient wishes to hold off on going back to PT at this time. He is going to the gym 3x a week and working on stretches. Still able to complete his ADLs. At this time, he is in need of a new mobile chair. No additional work up required at this time. Would recommend EKG and PFTs to establish baseline.    PT referral when patient would like to restart  Face to face for new chair  Follow up in 6 months      History of Present Illness     Mehdi Hicks is a 53 y.o. male who presents for 8 month follow up of Cook's muscular dystrophy. Father present for visit. Patient notes his strength has been stable with no changes or worsening weakness. He is going to the gym a few times a week and would like to hold off on returning to PT. He still drives and showers on his own without issue. He did not go for PFTs but he denies any shortness of breath or issues with swallowing. He is interested in getting a new chair at this time. Patient is working at [employer] and volunteers at [employer]. He has had a hard time since his mother passed away earlier this year.       Review of Systems   Constitutional:  Negative for appetite change, fatigue and fever.   HENT: Negative.  Negative for hearing loss, tinnitus, trouble swallowing and voice change.    Eyes: Negative.  Negative for photophobia, pain and visual disturbance.   Respiratory: Negative.  Negative for shortness of breath.    Cardiovascular: Negative.  Negative for palpitations.   Gastrointestinal: Negative.  Negative for nausea and vomiting.   Endocrine: Negative.  Negative for cold intolerance.   Genitourinary: Negative.  Negative for  "dysuria, frequency and urgency.   Musculoskeletal:  Negative for back pain, gait problem, myalgias, neck pain and neck stiffness.   Skin: Negative.  Negative for rash.   Allergic/Immunologic: Negative.    Neurological: Negative.  Negative for dizziness, tremors, seizures, syncope, facial asymmetry, speech difficulty, weakness, light-headedness, numbness and headaches.   Hematological: Negative.  Does not bruise/bleed easily.   Psychiatric/Behavioral: Negative.  Negative for confusion, hallucinations and sleep disturbance.    All other systems reviewed and are negative.    Pertinent Medical History       Current Outpatient Medications on File Prior to Visit   Medication Sig Dispense Refill    azithromycin (ZITHROMAX) 250 mg tablet if needed      Cholecalciferol (Vitamin D) 50 MCG (2000 UT) CAPS Take 1 capsule by mouth daily      fluticasone (FLONASE) 50 mcg/act nasal spray 2 sprays into each nostril daily 48 g 1    loteprednol etabonate (Lotemax) 0.5 % ophthalmic suspension INSTILL ONE DROP IN EACH EYE THREE TIMES DAILY      meloxicam (MOBIC) 7.5 mg tablet Take 1 tablet by mouth if needed      Nutritional Supplements (CREATINE) 750 MG CAPS Take 2 capsules by mouth daily      testosterone (ANDROGEL) 1.62 % Apply 1 packet (20.25 mg total) topically daily 30 packet 5    Zinc 50 MG TABS Take 1 tablet every day by oral route for 14 days.      ciclopirox (LOPROX) 0.77 % cream apply TO TO RASH ON foot and between TOES TWICE DAILY FOR FOURTEEN DAYS (Patient not taking: Reported on 11/7/2023)       No current facility-administered medications on file prior to visit.      Objective     /78 (BP Location: Left arm, Patient Position: Sitting, Cuff Size: Adult)   Pulse 88   Temp 98.5 °F (36.9 °C) (Temporal)   Ht 5' 10\" (1.778 m)   Wt 68 kg (150 lb)   SpO2 98%   BMI 21.52 kg/m²     Physical Exam  Pulmonary:      Effort: Pulmonary effort is normal.   Neurological:      Mental Status: He is alert and oriented to person, " "place, and time. Mental status is at baseline.       Speech was normal, no dysarthria or aphasia. No SOB with extended conversation.  Cranial nerve exam showed normal extraocular movements, no nystagmus or diplopia.   There was no ptosis at baseline or with sustained upward gaze.   Strength of eye closure muscles was normal.  Facial sensations were normal bilaterally.   No facial weakness, able to blow out the cheeks and push the tongue in the cheeks well.   No tongue atrophy or fasciculations.    Motor exam revealed atrophy of the upper extremities diffusely with bilateral scapular winging.   Muscle strength was 4+/5 in neck flexors and 5/5 in extensors.  Symmetric muscle strength in the upper extremities was as follows: Deltoid 3+/5, Biceps 2/5, Triceps 2/5, Wrist extensors 2/5, Wrist flexors 4/5, Finger extensors 4/5, finger flexors 4/5, Interossei 3/5.  Strength in the lower extremities was as follows (right/left): Hip flexors 1/5, Knee extensors 3/5, knee flexors 1/5, ankle dorsiflexors 2/5, ankle plantar flexors 4/5, ankle invertors 3/5, evertors 3/5.  Reflexes were graded as follows bilaterally: biceps mute, triceps mute, knee 1+, ankle 1+.     Sensory exam revealed normal sensation to light touch, pin prick, temperature, vibration and proprioception with the exception of decreased vibration below right ankle.    Gait deferred, patient in mobile wheelchair.      Portions of the record may have been created with voice recognition software. Occasional wrong word or \"sound a like\" substitutions may have occurred due to the inherent limitations of voice recognition software. Read the chart carefully and recognize, using context, where substitutions have occurred.      Administrative Statements           "

## 2024-08-07 ENCOUNTER — PATIENT MESSAGE (OUTPATIENT)
Dept: NEUROLOGY | Facility: CLINIC | Age: 54
End: 2024-08-07

## 2024-08-08 DIAGNOSIS — E29.1 HYPOGONADISM IN MALE: Primary | ICD-10-CM

## 2024-08-08 NOTE — TELEPHONE ENCOUNTER
I already spoke to patient earlier this morning and relayed the message. The updated note has been faxed already.

## 2024-08-08 NOTE — PATIENT COMMUNICATION
Patient would like a call back to discuss what is in note.  Please call back.  He doesn't know what volunteers at a distance means.  He would like the part taken out where it says he will likely leave after this season at [facility].

## 2024-08-13 DIAGNOSIS — E29.1 HYPOGONADISM IN MALE: ICD-10-CM

## 2024-08-15 ENCOUNTER — TELEPHONE (OUTPATIENT)
Age: 54
End: 2024-08-15

## 2024-08-15 NOTE — TELEPHONE ENCOUNTER
"Ursula Bentley MA   to Mehdi Wilksjoselito       8/12/24 12:12 PM  Poonam Mehdi,   Please see updated note.      Progress Notes  Danii Shaw MD (Resident)  Neurology  Cosigned by: Kevin Blue MD at 8/9/2024  9:50 AM               Attestation signed by Kevin Blue MD at 8/9/2024  9:50 AM (Updated)     Attending Attestation:     I reviewed the care furnished by the Resident/Fellow during the visit on 8/6/2024.  I was present in the office and personally saw and examined the patient on 8/6/2024.  Briefly, patient is a 53-year-old man with Suazo's muscular dystrophy, he was accompanied by his father today.  He was last evaluated by me In December 2023, now returns for follow-up.  Since last being seen, patient continues to have profound weakness in both upper and lower extremities, however functionally has been stable, he continues to work at [employer] and volunteers at [employer].  He goes to the gym a few times a week. He drives, independent to most activities of daily living. He is completely dependent on his power wheelchair, which is more than 7 years old and needs replacement.  At home, he uses the power chair, sit to stand lift, Jack lift, has ramp to get in and out of the house, he is able to take the power chair in the bathroom, and has a self-made \" shower chair\".  Exam was stable today, and as noted below, patient was awake and alert.  Speech was fluent without any dysarthria or aphasia.  Cranial nerve examination did not reveal any ptosis, normal extraocular movements, normal strength of eye closure muscles, no facial asymmetry or weakness.  On motor examination, patient has decreased bulk in both upper and lower extremities with scapular winging bilaterally.  Neck flexors are graded as 4+, and neck extensors were 5/5.  In the upper extremities, strength was graded as follows : Deltoid 3+/5, biceps 2/5, triceps 2/5, wrist extensors 2/5, wrist flexors 4/5, finger extensors 4, interossei 3.  In the " lower extremities, hip [place] are graded as 1, knee extensors 2, knee flexors 3+, dorsiflexors 4 hard to assess as he has contractures but greatest to, plantar flexors were 4.  Reflexes were absent throughout, sensation was normal, gait was not checked today.     Overall, this patient continues to have profound weakness secondary to underlying muscular dystrophy, however remains functionally independent.  Denies any shortness of breath at rest, or with exertion, encouraged him to schedule pulmonary function test that were ordered on his previous visit, last PFTs were more than 2 years ago.  Similarly recommended to get the EKG as was discussed on his last visit.     Besides this, patient needs to get a new power chair, which is a medical necessity for this patient.  He requires a power chair for activities of daily living, as well as mobilization within the community.  His prognosis is fair. Due to profound weakness in both upper and lower extremities, he is unable to use a cane or a walker, he is also unable to self propel a manual wheelchair due to lack of upper extremity strength.  A scooter will not meet the mobility needs of this patient because he lacks upper extremity strength, trunk stability, and cannot safely transfer in and out of the scooter.  He does exercise regularly, and uses a stander which helps him to stand up and stretch.  He requires power mobility due to progressive nature of his disease, and this allows him to continue to work, remain independent and getting out of the house.  He should also continue using the stander to stretch and weight-bear, given his progressive osteoporosis.  Weightbearing may slow down the process of his disease.     This patient has the physical and mental ability to operate a power wheelchair safely in his home.  He is willing and motivated to use the power wheel chair.  I recommend that a power wheelchair be obtained for the patient as a medical necessity. This  recommendation was discussed with the patient who agrees with this recommendation.  I have reviewed and agree with the specific power mobility recommendations that have been made by the physical or occupational therapist.     Patient will return to see me in 6 months.               Expand All Collapse All  Ambulatory Visit  Name: Mehdi Hicks      : 1970      MRN: [id number]  Encounter Provider: Kevin Blue MD  Encounter Date: 2024   Encounter department: NEUROLOGY [facility]        Assessment & Plan  1. Suazo's muscular dystrophy (HCC)  Assessment & Plan:  Stable findings on exam. No significant worsening on exam. Patient wishes to hold off on going back to PT at this time. He is going to the gym 3x a week and working on stretches. Still able to complete his ADLs. At this time, he is in need of a new mobile chair. No additional work up required at this time. Would recommend EKG and PFTs to establish baseline.     PT referral when patient would like to restart  Face to face for new chair  Follow up in 6 months              History of Present Illness     Mehdi Hicks is a 53 y.o. male who presents for 8 month follow up of Cook's muscular dystrophy. Father present for visit. Patient notes his strength has been stable with no changes or worsening weakness. He is going to the gym a few times a week and would like to hold off on returning to PT. He still drives and showers on his own without issue. He did not go for PFTs but he denies any shortness of breath or issues with swallowing. He is interested in getting a new chair at this time. Patient is working at [employer] and volunteers at [employer]. He has had a hard time since his mother passed away earlier this year.         Review of Systems   Constitutional:  Negative for appetite change, fatigue and fever.   HENT: Negative.  Negative for hearing loss, tinnitus, trouble swallowing and voice change.    Eyes: Negative.  Negative for  photophobia, pain and visual disturbance.   Respiratory: Negative.  Negative for shortness of breath.    Cardiovascular: Negative.  Negative for palpitations.   Gastrointestinal: Negative.  Negative for nausea and vomiting.   Endocrine: Negative.  Negative for cold intolerance.   Genitourinary: Negative.  Negative for dysuria, frequency and urgency.   Musculoskeletal:  Negative for back pain, gait problem, myalgias, neck pain and neck stiffness.   Skin: Negative.  Negative for rash.   Allergic/Immunologic: Negative.    Neurological: Negative.  Negative for dizziness, tremors, seizures, syncope, facial asymmetry, speech difficulty, weakness, light-headedness, numbness and headaches.   Hematological: Negative.  Does not bruise/bleed easily.   Psychiatric/Behavioral: Negative.  Negative for confusion, hallucinations and sleep disturbance.    All other systems reviewed and are negative.              Last read by Mehdi Hicks at 10:50 AM on 8/14/2024.    Mehdi Hicks   to [facility] (supporting Ursula Bentley MA)       8/12/24  2:42 PM  You can fax to [employer] then inderjit hagan    -----------------------------------------------    Inbound call received from Patient to follow up on his request to have the updated office notes faxed to [employer]. Please see Patient Message 08/07/2024.     Ursula Bentley MA:   Please send fax to [facility] [facility] [facility] ([phone] Per [employer] request form received on 07/09/2024 provided by Sharlene Cárdenas)  Please confirm fax went through with Patient at [phone], he confirmed we may leave a detailed message if needed. Thank you!

## 2024-08-16 RX ORDER — TESTOSTERONE 20.25 MG/1.25G
20.25 GEL TOPICAL DAILY
Qty: 30 PACKET | Refills: 5 | Status: SHIPPED | OUTPATIENT
Start: 2024-08-16

## 2024-08-20 NOTE — TELEPHONE ENCOUNTER
Ursula Bentley MA   to Mehdi Hicks DR      8/19/24  7:07 AM  Poonam Harding,   I have faxed the note to [employer] [facility] on 8/8/24 and the fax confirmation went through as well.     Last read by Mehdi Hicks at  1:10 PM on 8/20/2024.  -----------------------------------    Inbound call received by Patient to let us know the note had to be updated (corrections he had requested were made) since that fax was sent on 08/08/2024 and the last updated note from 08/12/2024 needs to be sent. The note information the Patient wants to have faxed is in the previous message in this encounter from 08/15/2024 and can be found in Patient [employer] messages as well. Patient said he would send it himself but he thinks it needs to come from us and have our official letter head.     Ursula Bentley MA:   Please send fax to [facility] [phone] Per [employer] request form received on 07/09/2024 provided by Sharlene Cárdenas)  Please confirm fax went through with Patient at [phone], he confirmed we may leave a detailed message if needed. Thank you!

## 2024-09-23 ENCOUNTER — TELEPHONE (OUTPATIENT)
Age: 54
End: 2024-09-23

## 2024-09-23 DIAGNOSIS — G71.01 BECKER'S MUSCULAR DYSTROPHY (HCC): Primary | ICD-10-CM

## 2024-09-23 NOTE — TELEPHONE ENCOUNTER
Patient called and is requesting a prescription for PT/OT to [facility].    [facility] fax # [phone]

## 2024-10-01 NOTE — TELEPHONE ENCOUNTER
"Received a call from patient requesting we refax referrals for PT/OT to\"    [facility]  ATTN: [facility]PAT[facility].    Fax # [phone]    Connie, would you kindly assist? Thank you!      "

## 2024-10-07 ENCOUNTER — OFFICE VISIT (OUTPATIENT)
Dept: UROLOGY | Facility: MEDICAL CENTER | Age: 54
End: 2024-10-07
Payer: MEDICARE

## 2024-10-07 VITALS
WEIGHT: 155 LBS | HEIGHT: 70 IN | DIASTOLIC BLOOD PRESSURE: 68 MMHG | BODY MASS INDEX: 22.19 KG/M2 | SYSTOLIC BLOOD PRESSURE: 112 MMHG | HEART RATE: 80 BPM | OXYGEN SATURATION: 95 %

## 2024-10-07 DIAGNOSIS — N20.1 CALCULUS OF URETER: Primary | ICD-10-CM

## 2024-10-07 DIAGNOSIS — N40.0 BENIGN PROSTATIC HYPERPLASIA WITHOUT LOWER URINARY TRACT SYMPTOMS: ICD-10-CM

## 2024-10-07 PROBLEM — N13.2 HYDRONEPHROSIS WITH OBSTRUCTING CALCULUS: Status: ACTIVE | Noted: 2024-09-07

## 2024-10-07 PROCEDURE — 99204 OFFICE O/P NEW MOD 45 MIN: CPT | Performed by: UROLOGY

## 2024-10-07 NOTE — PATIENT INSTRUCTIONS
So lets see you drinking lots of fluids to your bladder control okay no leakage the stream is good get to get go to bathroom nighttime okay usually sleeping like when he is in bed when you are better you do not know what that that was prior to strange episode.    Calcium citrate, 500 mg twice per day, is the best calcium to take if you have stone    DRINK 3 QUARTS (96 Oz.) LIQUIDS EACH DAY - ALL LIQUIDS COUNT       ** THESE FOODS ARE HIGH IN [place] - TRY TO LIMIT HOW MUCH OF THESE YOU EAT:  Coke and Pepsi  Nuts  Dark Leafy Greens:     Spinach and Kale, Rhubarb, Chard  Asparagus  Beets  Sweet potatoes   Blueberries, Strawberries   Dark tea (Green tea is okay)  Tofu    ADD LEMON JUICE 3 OZ. DAILY - Fresh squeezed or lemon juice concentrate - Not MinuteMaid, [place], Crystal Lite, etc.        ** Recipe for ROSE'S OLDMARION TYME LEMONADE:         One ounce of lemon juice, glass of water, sweetener of your choice    Coffee is okay!  Cranberry juice is good to prevent infections, but does not help for stones.

## 2024-10-09 NOTE — PROGRESS NOTES
"   HISTORY:    Recent renal colic which was transient just a few days.  CT scan at [facility] on September 7, 2024 showed mild right hydro and stone lodged at the right UVJ.  5 mm diameter.    He has a CT scan scheduled for next week they will [facility].    Minimal BPH symptoms    No prior stones    Muscular dystrophy, does have some degree of BPH symptoms with slower flow urgency feels like he does not empty all out well         ASSESSMENT / PLAN:    We will look at the CT when it becomes available.    If stone still there, schedule ureteroscopy laser stone stent.  Potential complication discussed.  Procedure increased risk because of wheelchair status, risk of blood clots,    The following portions of the patient's history were reviewed and updated as appropriate: allergies, current medications, past family history, past medical history, past social history, past surgical history, and problem list.    Review of Systems      Objective:     Physical Exam  Abdominal:      Comments: Exam in chair showed unremarkable abdomen           No results found for: \"PSA\"]  BUN   Date Value Ref Range Status   09/08/2024 13 7 - 28 mg/dL Final     Creatinine   Date Value Ref Range Status   09/08/2024 0.36 (L) 0.53 - 1.30 mg/dL Final     No components found for: \"CBC\"      Patient Active Problem List   Diagnosis    Suazo's muscular dystrophy (HCC)    Allergic rhinitis    Left tibial fracture    Right tibial fracture    Other insomnia    Ambulatory dysfunction    Depression with anxiety    Constipation    Familial cardiomyopathy ([facility])    Acquired genu recurvatum    Tibial plateau fracture    Disorder of rotator cuff    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 male.      Current Outpatient Medications:     azithromycin (ZITHROMAX) 250 mg tablet, if needed, Disp: , Rfl:     Cholecalciferol (Vitamin D) 50 MCG (2000 UT) CAPS, Take 1 capsule by mouth daily, Disp: , Rfl:     fluticasone (FLONASE) 50 mcg/act nasal spray, 2 sprays into each nostril daily, Disp: 48 g, Rfl: 1    Nutritional Supplements (CREATINE) 750 MG CAPS, Take 2 capsules by mouth daily, Disp: , Rfl:     testosterone (ANDROGEL) 1.62 %, Apply 1 packet (20.25 mg total) topically daily, Disp: 30 packet, Rfl: 5    Zinc 50 MG TABS, Take 1 tablet every day by oral route for 14 days., Disp: , Rfl:     loteprednol etabonate (Lotemax) 0.5 % ophthalmic suspension, INSTILL ONE DROP IN EACH EYE THREE TIMES DAILY (Patient not taking: Reported on 10/7/2024), Disp: , Rfl:     meloxicam (MOBIC) 7.5 mg tablet, Take 1 tablet by mouth if needed (Patient not taking: Reported on 10/7/2024), Disp: , Rfl:     Past Medical History:   Diagnosis Date    Allergic rhinitis 6/16/2020    Suazo's muscular dystrophy (HCC)     since 9th grade    Other osteoporosis without current pathological fracture 4/23/2024       Past Surgical History:   Procedure Laterality Date    MUSCLE BIOPSY         Social History

## 2024-10-15 ENCOUNTER — TELEPHONE (OUTPATIENT)
Age: 54
End: 2024-10-15

## 2024-10-15 NOTE — TELEPHONE ENCOUNTER
Please call him to schedule his Medicare wellness along with his routine follow-up with Dr. Berman.  Thank you

## 2024-10-15 NOTE — TELEPHONE ENCOUNTER
Patient called and left a voice message stating that he still has a slight sore throat and nasal drip  He wanted to know if he could get a refill on the cough medication that he was prescribed at his previous appt    Any questions the patient can be reached at [phone] SSC spoke with Zuri @ [facility], who states that they are unable to accept pt due to IV ABX every 4 hours and can not accommodate with that frequency.     Spoke with pt's  and daughter about the above and needing new choices. Sent referral to [facility] via [employer] Fax. Choice list sent via [employer] to number provided by  [phone].    Left a voicemail for Melvina @ [facility], awaiting a call back at this time.

## 2024-10-22 ENCOUNTER — TELEPHONE (OUTPATIENT)
Age: 54
End: 2024-10-22

## 2024-10-23 NOTE — TELEPHONE ENCOUNTER
The report of his recent CT scan does not show any obstructing stones or hydronephrosis.  He does not need any surgical intervention.  He should keep himself hydrated with at least 2 L of water a day unless he is on a fluid restriction.  Should follow-up in 1 year

## 2024-10-29 ENCOUNTER — TELEPHONE (OUTPATIENT)
Age: 54
End: 2024-10-29

## 2024-10-29 NOTE — TELEPHONE ENCOUNTER
10/15/24 CT UROGRAM    Impression:   1.  No CT evidence of hydronephrosis, obstructing urinary stones, or filling   defects within the renal collecting system.   2.  1.6 cm right renal cyst.     Please review.      CB#[phone]

## 2024-10-30 NOTE — TELEPHONE ENCOUNTER
Call placed to pt and spoke with him. Informed him of the AP recommendations and CT scan results.   Pt is aware and thankful for the call

## 2024-10-30 NOTE — TELEPHONE ENCOUNTER
Please let the patient know that the results of his CT scan do not show any hydronephrosis or obstructing stones. No surgical intervention is necessary.     Thank you!

## 2024-11-14 ENCOUNTER — PATIENT MESSAGE (OUTPATIENT)
Dept: NEUROLOGY | Facility: CLINIC | Age: 54
End: 2024-11-14

## 2024-11-15 NOTE — PATIENT COMMUNICATION
"Outbound call placed to [facility] and spoke with Corrine.    Corrine provided fax number [phone].  Stated that the WC script and a demographics page needs to be faxed to the facility. Once received the facility will review it and call the patient to schedule an appt.       Clerical: can you please fax WC script from 4/15/24 under \"other orders\" and fax a demographics sheet to [phone]   "

## 2024-11-21 ENCOUNTER — NURSE TRIAGE (OUTPATIENT)
Age: 54
End: 2024-11-21

## 2024-11-21 NOTE — TELEPHONE ENCOUNTER
Patient of Dr. Kendall, last seen 10/7/24    Patient calling with complaints of intermittent right low abdomen pain that radiates to right hip. He is questioning his recent imaging. Reviewed results with him    Encouraged to increase water, decrease bladder irritants, tylenol and/or Ibuprofen and heating pad    Please advise if any other recommendations  Reason for Disposition   Recent negative imaging and/or negative urine testing, non-obstructing kidney stones, with or without history of IC    Answer Assessment - Initial Assessment Questions  1. When did this pain start?   1 month  2. Where is your pain located at? (upper, lower abdomen, right, left, or both sides or somewhere else?)   Right low abdomen  3. Does your pain radiate anywhere?   radiates towards right hip  4. Can you qualify the pain: constant, intermittent, sharp, dull, aching? Pain scale?   Intermittent dull ache, pain level 3 out of 10  5. Do you have nausea or vomiting?   denies  6. Do you have a fever? If yes, what was it and have you taken any medication for the fever?  denies   7. Do you have any urinary symptoms?   denies  8. Do you have a history of kidney stones, UTI's, bladder dysfunction or interstitial cystitis?   Kidney stones  9. Have you had recent urologic procedure or surgery, seen OB GYN, URO GYN, or a PCP for this problem? If yes, what was the procedure and/or provider seen and did you have any recent imaging? (Kidney ultrasound, pelvic ultrasound, KUB or CT scan)   denies  10. Have you been prescribed any medication for this problem?   denies  11. Are your bowel movements regular?   yes    Protocols used: Urology- Bladder / Pelvic Pain-ADULT-[place]

## 2024-12-09 ENCOUNTER — TELEPHONE (OUTPATIENT)
Dept: UROLOGY | Facility: MEDICAL CENTER | Age: 54
End: 2024-12-09

## 2024-12-09 NOTE — TELEPHONE ENCOUNTER
"Patients brother, Dixon, mentioned how patient had a CT scan done a month and a half ago but never heard results from this. Dixon said Dr. Kendall ordered this.  Dixon says patient went to  imaging over on [facility].    I looked in patients chart and there is a scan labeled \"External Imaging\" and the address is [facility] which is next to [facility].    I told Dixon I will have Dr. Kendall review the scan and someone will call with results.     He was appreciative.  He said the patient still complains of mild pain in his side.       "

## 2024-12-11 NOTE — TELEPHONE ENCOUNTER
Spoke to patients brother, Dixon, and informed him of CT scan.  Dixon then said patient was at [facility] in the hospital when they did the scan and he was sorry for the miscommunication.  He was pleased to hear the results were good.

## 2025-01-14 DIAGNOSIS — M81.8 OTHER OSTEOPOROSIS WITHOUT CURRENT PATHOLOGICAL FRACTURE: Primary | ICD-10-CM

## 2025-01-18 NOTE — TELEPHONE ENCOUNTER
Pre Adrienne Hanks was not stated as Imaging is not in the [address] [place] [address] Road to be worked  I will find out if Adrienne Hanks is needed  no

## 2025-01-20 NOTE — ASSESSMENT & PLAN NOTE
----- Message from Dr. Moiz Bhardwaj MD sent at 1/20/2025  1:35 PM EST -----  Please let patient know:  echo looks OK. Has some thickening of the septal portion of her heart but is not significant that probably accounts for her EKG changes. No treatment needed. No restrictions. OK for surgery.    Continue PT/OT  Provide supportive care    Follow up with Neurology as outpatient

## 2025-02-18 ENCOUNTER — OFFICE VISIT (OUTPATIENT)
Dept: NEUROLOGY | Facility: CLINIC | Age: 55
End: 2025-02-18
Payer: MEDICARE

## 2025-02-18 VITALS
HEART RATE: 70 BPM | OXYGEN SATURATION: 98 % | SYSTOLIC BLOOD PRESSURE: 122 MMHG | TEMPERATURE: 98.5 F | HEIGHT: 70 IN | DIASTOLIC BLOOD PRESSURE: 78 MMHG | WEIGHT: 155 LBS | BODY MASS INDEX: 22.19 KG/M2

## 2025-02-18 DIAGNOSIS — G71.01 BECKER'S MUSCULAR DYSTROPHY (HCC): Primary | ICD-10-CM

## 2025-02-18 PROCEDURE — 99214 OFFICE O/P EST MOD 30 MIN: CPT | Performed by: PSYCHIATRY & NEUROLOGY

## 2025-02-18 PROCEDURE — G2211 COMPLEX E/M VISIT ADD ON: HCPCS | Performed by: PSYCHIATRY & NEUROLOGY

## 2025-02-18 RX ORDER — PHENOL 1.4 %
600 AEROSOL, SPRAY (ML) MUCOUS MEMBRANE 2 TIMES DAILY WITH MEALS
COMMUNITY

## 2025-02-18 NOTE — ASSESSMENT & PLAN NOTE
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  He continues to rely on his power wheelchair for ambulation, does have a Jack lift, sit to stand lift at home, as well as a shower chair.  He does not think he needs any other equipment at this time, he will reach out to me if he needs any new prescription for power wheelchair repairs.  Speech and swallowing are normal.  Denies any palpitations, chest pain, shortness of breath at rest or with exertion, has not had PFTs and echocardiogram done in more than 3 years, strongly encouraged him to make those appointments.  This was recommended on previous visits as well.  Did have an EKG in September at [facility], which was reported as sinus bradycardia with nonspecific T wave abnormality.  I have asked him to follow this with his PCP as well.  Does not need any noninvasive ventilation at this time, we will continue to monitor this closely.  Sleep is not an issue, sleeps well, does not report excessive fatigue during the daytime.  Has a DEXA scan scheduled for him in the near future, he will follow this with endocrinology as well as his PCP.    Continue OT as he has been doing, he will reach out if he needs any other prescriptions for physical or occupational therapy in the future, else will return to see me in 6 months.      Orders:    Complete PFT; Future    Echo complete w/ contrast if indicated; Future

## 2025-02-18 NOTE — PROGRESS NOTES
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  He continues to rely on his power wheelchair for ambulation, does have a Jack lift, sit to stand lift at home, as well as a shower chair.  He does not think he needs any other equipment at this time, he will reach out to me if he needs any new prescription for power wheelchair repairs.  Speech and swallowing are normal.  Denies any palpitations, chest pain, shortness of breath at rest or with exertion, has not had PFTs and echocardiogram done in more than 3 years, strongly encouraged him to make those appointments.  This was recommended on previous visits as well.  Did have an EKG in September at [facility], which was reported as sinus bradycardia with nonspecific T wave abnormality.  I have asked him to follow this with his PCP as well.  Does not need any noninvasive ventilation at this time, we will continue to monitor this closely.  Sleep is not an issue, sleeps well, does not report excessive fatigue during the daytime.  Has a DEXA scan scheduled for him in the near future, he will follow this with endocrinology as well as his PCP.    Continue OT as he has been doing, he will reach out if he needs any other prescriptions for physical or occupational therapy in the future, else will return to see me in 6 months.      Orders:    Complete PFT; Future    Echo complete w/ contrast if indicated; Future          History of Present Illness   HPI     I had the pleasure of seeing your patient in neurology clinic for neuromuscular follow-up.  As you know, patient is a  54-year-old man with Suazo's muscular dystrophy, diagnosed in 1987 when he was in ninth grade.  He was last evaluated by me in August, now returns for follow-up.    Since last being seen, he denies any significant change in his muscle weakness, continues to have profound weakness in both upper and lower extremities.  He denies any change in speech, no change in swallowing, denies any pain or muscle spasms.  Arms may feel weaker than usual here and there, nothing consistent.  He did get a new power chair in December 2024, patient reports it needs some firmware updates, he is working with the mobility company.  He does have a Jack lift that he uses in the morning, also has a sit to stand lift and a shower chair.  He does have a ramp to get in and out of the house.  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  He other than that remains active, keeps himself busy.  Denies any chest pain, no shortness of breath at rest or with ADLs, denies any palpitations.  He was seen in emergency room at [facility] in September for a kidney stone, routine labs were normal, had an EKG at the time that was reported as sinus bradycardia with nonspecific T wave abnormality.  Troponins were normal.  On my last visit, we had discussed pulmonary function test, which patient has not been able to follow through.  Does have a DEXA scan ordered from primary care physician.    Mom passed away last year in spring.      Review of Systems   Constitutional:  Negative for appetite change, fatigue and fever.   HENT: Negative.  Negative for hearing loss, tinnitus, trouble swallowing and voice change.    Eyes: Negative.  Negative for photophobia, pain and visual disturbance.   Respiratory: Negative.  Negative for shortness of breath.    Cardiovascular: Negative.  Negative for  "palpitations.   Gastrointestinal: Negative.  Negative for nausea and vomiting.   Endocrine: Negative.  Negative for cold intolerance.   Genitourinary: Negative.  Negative for dysuria, frequency and urgency.   Musculoskeletal:  Negative for back pain, gait problem, myalgias, neck pain and neck stiffness.   Skin: Negative.  Negative for rash.   Allergic/Immunologic: Negative.    Neurological:  Positive for weakness (right arm at times). Negative for dizziness, tremors, seizures, syncope, facial asymmetry, speech difficulty, light-headedness, numbness and headaches.   Hematological: Negative.  Does not bruise/bleed easily.   Psychiatric/Behavioral: Negative.  Negative for confusion, hallucinations and sleep disturbance.    All other systems reviewed and are negative.   I have personally reviewed the MA's review of systems and made changes as necessary.         Objective   /78 (BP Location: Left arm, Patient Position: Sitting, Cuff Size: Adult)   Pulse 70   Temp 98.5 °F (36.9 °C) (Temporal)   Ht 5' 10\" (1.778 m)   Wt 70.3 kg (155 lb)   SpO2 98%   BMI 22.24 kg/m²     Physical Exam  On general exam, patient was not in any acute distress.  HEENT was unremarkable, sitting comfortably in his power wheelchair.  Extremities did not reveal any edema.    Neurological Exam  Neurologically, patient was awake and alert, speech was fluent without any dysarthria.  Cranial examination did not reveal any ptosis, normal extraocular movements, normal strength of eye closure muscles, no facial asymmetry or weakness, tongue was midline without atrophy or fasciculations.  On motor examination, strength was graded as 5 - in the neck extensors, neck flexors were graded as 4.  He has bilateral scapular winging, deltoids were graded as 3+, bicep and triceps were 2 bilaterally, wrist extensors were 3/3, wrist flexors 4, interossei were 3 bilaterally with atrophy of muscles in both upper extremities and biceps, triceps as well as " interossei.  In the lower extremities, iliopsoas were graded as 1, quadriceps and hamstrings were 2, plantar flexors were 4, dorsiflexors were hard to assess as patient has bilateral Achilles contractures.  He does have tightening of the hamstring muscles as well.  Reflexes were decreased throughout.  Gait was not checked as patient is nonambulatory.

## 2025-03-17 NOTE — TELEPHONE ENCOUNTER
----- Message from Brandie Raymond sent at 5/2/2024 11:43 AM EDT -----  Regarding: FW: Approval  Contact: [phone]    ----- Message -----  From: Mehdi Hicks  Sent: 5/2/2024  11:41 AM EDT  To: Endocrinology Pod Clinical  Subject: Approval                                         The pharmacy did information yet. Can we check on it     Render Note In Bullet Format When Appropriate: No Show Applicator Variable?: Yes Duration Of Freeze Thaw-Cycle (Seconds): 10 Post-Care Instructions: I reviewed with the patient in detail post-care instructions. Patient is to wear sunprotection, and avoid picking at any of the treated lesions. Pt may apply Vaseline to crusted or scabbing areas. Detail Level: Zone Number Of Freeze-Thaw Cycles: 1 freeze-thaw cycle Consent: The patient's consent was obtained including but not limited to risks of crusting, scabbing, blistering, scarring, darker or lighter pigmentary change, recurrence, incomplete removal and infection.

## 2025-04-02 ENCOUNTER — VBI (OUTPATIENT)
Dept: ADMINISTRATIVE | Facility: OTHER | Age: 55
End: 2025-04-02

## 2025-04-02 NOTE — TELEPHONE ENCOUNTER
Patient contacted to schedule Annual Wellness Visit.   Patient declined to schedule appointment.     Thank you.  Nan Manzo MA  [facility]

## 2025-04-14 DIAGNOSIS — M81.8 OTHER OSTEOPOROSIS WITHOUT CURRENT PATHOLOGICAL FRACTURE: Primary | ICD-10-CM

## 2025-04-30 DIAGNOSIS — M81.8 OTHER OSTEOPOROSIS WITHOUT CURRENT PATHOLOGICAL FRACTURE: Primary | ICD-10-CM

## 2025-05-20 ENCOUNTER — HOSPITAL ENCOUNTER (OUTPATIENT)
Dept: PULMONOLOGY | Facility: HOSPITAL | Age: 55
Discharge: HOME | End: 2025-05-20
Payer: MEDICARE

## 2025-05-20 ENCOUNTER — HOSPITAL ENCOUNTER (OUTPATIENT)
Dept: NON INVASIVE DIAGNOSTICS | Facility: HOSPITAL | Age: 55
Discharge: HOME | End: 2025-05-20
Payer: MEDICARE

## 2025-05-20 VITALS
BODY MASS INDEX: 22.19 KG/M2 | DIASTOLIC BLOOD PRESSURE: 78 MMHG | HEART RATE: 75 BPM | WEIGHT: 155 LBS | SYSTOLIC BLOOD PRESSURE: 122 MMHG | HEIGHT: 70 IN

## 2025-05-20 DIAGNOSIS — G71.01 BECKER'S MUSCULAR DYSTROPHY (HCC): ICD-10-CM

## 2025-05-20 LAB
BSA FOR ECHO PROCEDURE: 1.87 M2
DOP CALC LVOT AREA: 2.54 CM2
DOP CALC LVOT DIAMETER: 1.8 CM
E WAVE DECELERATION TIME: 121 MS
E/A RATIO: 0.78
FRACTIONAL SHORTENING: 22 (ref 28–44)
INTERVENTRICULAR SEPTUM IN DIASTOLE (PARASTERNAL SHORT AXIS VIEW): 1.2 CM
INTERVENTRICULAR SEPTUM: 1.2 CM (ref 0.6–1.1)
LAAS-AP4: 19.9 CM2
LEFT ATRIUM AREA SYSTOLE SINGLE PLANE A4C: 19.5 CM2
LEFT INTERNAL DIMENSION IN SYSTOLE: 2.9 CM (ref 2.1–4)
LEFT VENTRICLE DIASTOLIC VOLUME (MOD BIPLANE): 100 ML
LEFT VENTRICLE DIASTOLIC VOLUME INDEX (MOD BIPLANE): 53.5 ML/M2
LEFT VENTRICLE SYSTOLIC VOLUME (MOD BIPLANE): 53 ML
LEFT VENTRICLE SYSTOLIC VOLUME INDEX (MOD BIPLANE): 28.3 ML/M2
LEFT VENTRICULAR INTERNAL DIMENSION IN DIASTOLE: 3.7 CM (ref 3.5–6)
LEFT VENTRICULAR POSTERIOR WALL IN END DIASTOLE: 1.2 CM
LEFT VENTRICULAR STROKE VOLUME: 26 ML
LV EF BIPLANE MOD: 47 %
LV EF US.2D.A4C+ESTIMATED: 44 %
LVSV (TEICH): 26 ML
MV E'TISSUE VEL-LAT: 7 CM/S
MV E'TISSUE VEL-SEP: 6 CM/S
MV PEAK A VEL: 0.63 M/S
MV PEAK E VEL: 49 CM/S
MV STENOSIS PRESSURE HALF TIME: 35 MS
MV VALVE AREA P 1/2 METHOD: 6.3
RA PRESSURE ESTIMATED: 3 MMHG
RIGHT ATRIUM AREA SYSTOLE A4C: 17.5 CM2
RIGHT VENTRICLE ID DIMENSION: 4.2 CM
SL CV LV EF: 45
SL CV PED ECHO LEFT VENTRICLE DIASTOLIC VOLUME (MOD BIPLANE) 2D: 58 ML
SL CV PED ECHO LEFT VENTRICLE SYSTOLIC VOLUME (MOD BIPLANE) 2D: 32 ML
TRICUSPID VALVE PEAK REGURGITATION VELOCITY: 1.24 M/S

## 2025-05-20 PROCEDURE — 93306 TTE W/DOPPLER COMPLETE: CPT

## 2025-05-20 PROCEDURE — 94760 N-INVAS EAR/PLS OXIMETRY 1: CPT

## 2025-05-20 PROCEDURE — 94729 DIFFUSING CAPACITY: CPT

## 2025-05-20 PROCEDURE — 93306 TTE W/DOPPLER COMPLETE: CPT | Performed by: STUDENT IN AN ORGANIZED HEALTH CARE EDUCATION/TRAINING PROGRAM

## 2025-05-20 PROCEDURE — 94010 BREATHING CAPACITY TEST: CPT

## 2025-06-02 ENCOUNTER — OFFICE VISIT (OUTPATIENT)
Dept: ENDOCRINOLOGY | Facility: CLINIC | Age: 55
End: 2025-06-02
Payer: MEDICARE

## 2025-06-02 VITALS
DIASTOLIC BLOOD PRESSURE: 78 MMHG | BODY MASS INDEX: 22.19 KG/M2 | TEMPERATURE: 98.2 F | HEIGHT: 70 IN | OXYGEN SATURATION: 97 % | HEART RATE: 89 BPM | WEIGHT: 155 LBS | RESPIRATION RATE: 16 BRPM | SYSTOLIC BLOOD PRESSURE: 132 MMHG

## 2025-06-02 DIAGNOSIS — E29.1 HYPOGONADISM IN MALE: ICD-10-CM

## 2025-06-02 DIAGNOSIS — G71.01 BECKER'S MUSCULAR DYSTROPHY (HCC): ICD-10-CM

## 2025-06-02 DIAGNOSIS — E55.9 VITAMIN D DEFICIENCY: ICD-10-CM

## 2025-06-02 DIAGNOSIS — M81.8 OTHER OSTEOPOROSIS WITHOUT CURRENT PATHOLOGICAL FRACTURE: Primary | ICD-10-CM

## 2025-06-02 PROCEDURE — 99215 OFFICE O/P EST HI 40 MIN: CPT | Performed by: INTERNAL MEDICINE

## 2025-06-02 PROCEDURE — G2211 COMPLEX E/M VISIT ADD ON: HCPCS | Performed by: INTERNAL MEDICINE

## 2025-06-02 RX ORDER — TESTOSTERONE ENANTHATE 50 MG/.5ML
50 INJECTION SUBCUTANEOUS WEEKLY
Qty: 2 ML | Refills: 2 | Status: SHIPPED | OUTPATIENT
Start: 2025-06-02

## 2025-06-02 NOTE — ASSESSMENT & PLAN NOTE
He got 1 injection of reclast and tolerated well.    We agreed to continue reclast and use a DXA scan every 2 years for monitoring.  Order placed in beacon.advised to schedule at [facility].    Follow up with repeat labs in 6-12 months.   at home:

## 2025-06-02 NOTE — PROGRESS NOTES
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 Last visit was 4/23/24.     Since last visit, he feels well. No complaints. He has improved physical activity significantly and feels better.      Follows Dr. Ramírez for muscular dystrophy.     DXA 3/9/22: tscores 0.8LS, -3.3 LTH and -3.0 Lt femoral neck. 10 yr frax scores 8% for hip and 17% for major osteoporotic #.  5/18/23 DXA: T scores 0.8LS, -2.7 LTH, -2.8 LFN. STATISTICALLY SIGNIFICANT INCREASE in bone mineral density of 0.087 g/cm2 (16.2%) in the left total hip. 10 yr FRAX score 4.9% hip and 15% major osteoporotic fracture.   4/2/24 DXA: Tscores 1.2LS, -2.9LTH, -3.1LFN. 10 yr FRAX score is 8% hip and 18% major osteoporotic fracture.    Physical Exam:  Body mass index is 22.24 kg/m².  /78 (BP Location: Left arm, Patient Position: Sitting)   Pulse 89   Temp 98.2 °F (36.8 °C) (Temporal)   Resp 16   Ht 5' 10\" (1.778 m)   Wt 70.3 kg (155 lb) Comment: patient reported, unable to weight patient in wheelchair  SpO2 97%   BMI 22.24 kg/m²    Vitals:    06/02/25 1455   Weight: 70.3 kg (155 lb)        Physical Exam  Constitutional:       General: He is not in acute distress.     Appearance: He is well-developed.   HENT:      Head: Normocephalic and atraumatic.      Nose: Nose normal.     Eyes:      Conjunctiva/sclera: Conjunctivae normal.     Pulmonary:      Effort: Pulmonary effort is normal.   Abdominal:      General: There is no distension.     Musculoskeletal:      Cervical back: Normal range of motion and neck supple.     Skin:     Findings: No rash.      Comments: No icterus     Neurological:      Mental Status: He is alert and oriented to person, place, and time.       Labs:   Lab Results   Component Value Date    HGBA1C 5.2 " "01/30/2017       Lab Results   Component Value Date    CVU2UUDPQRYM 2.220 07/11/2022    TSH 2.24 09/08/2024    U0SXATY 11.7 01/30/2017       Lab Results   Component Value Date    CREATININE 0.36 (L) 09/08/2024    CREATININE 0.38 (L) 09/07/2024    CREATININE 0.28 (L) 06/24/2024    BUN 13 09/08/2024     10/21/2015    K 4.3 09/08/2024     09/08/2024    CO2 28 09/08/2024     eGFRcr   Date Value Ref Range Status   09/08/2024 133 >59 Final       Lab Results   Component Value Date    ALT 24 09/08/2024    AST 23 09/08/2024    ALKPHOS 87 09/08/2024    BILITOT 0.28 10/21/2015       Lab Results   Component Value Date    CHOLESTEROL 181 11/08/2017    CHOLESTEROL 196 01/30/2017     Lab Results   Component Value Date    HDL 57 11/08/2017    HDL 58 01/30/2017     Lab Results   Component Value Date    TRIG 101 11/08/2017    TRIG 107 01/30/2017     No results found for: \"NONHDLC\"      Assessment/Plan:    1. Other osteoporosis without current pathological fracture  Assessment & Plan:  He got 1 injection of reclast and tolerated well.    We agreed to continue reclast and use a DXA scan every 2 years for monitoring.  Order placed in beacon.advised to schedule at [facility].    Follow up with repeat labs in 6-12 months.  Orders:  -     C-Telopeptide; Future  -     NTX Panel, Urine; Future  -     PTH, intact; Future  -     Phosphorus; Future  -     Comprehensive metabolic panel; Future  -     Alkaline phosphatase, bone specific; Future  2. Hypogonadism in male  Assessment & Plan:  He has hypogonadotropic hypogonadism.  He seems to maintain low total and free T levels in spite of topical testosterone.    He is unable to use intramuscular testosterone injection due to muscular dystrophy.  We agreed to try Xyosted as topical Testosterone doesn't seem to be working - if this was not approved, will consider higher dose of topical agent.    Repeat labs in a few months   Follow up in 6-12 months.  Orders:  -     Estradiol; " Future  -     Testosterone, free, total; Future  -     Sex Hormone Binding Globulin; Future  -     Prolactin; Future  -     Testosterone Enanthate (Xyosted) 50 MG/0.5ML SOAJ; Inject 50 mg under the skin once a week  3. Vitamin D deficiency  -     Vitamin D 25 hydroxy; Future  4. Suazo's muscular dystrophy (HCC)        I have spent a total time of 40 minutes on 06/02/25 in caring for this patient including greater than 50% of this time was spent in counseling/coordination of care as listed above.       Discussed with the patient and all questioned fully answered. He will contact me with concerns.    Nel Correa

## 2025-06-02 NOTE — ASSESSMENT & PLAN NOTE
He has hypogonadotropic hypogonadism.  He seems to maintain low total and free T levels in spite of topical testosterone.    He is unable to use intramuscular testosterone injection due to muscular dystrophy.  We agreed to try Xyosted as topical Testosterone doesn't seem to be working - if this was not approved, will consider higher dose of topical agent.    Repeat labs in a few months   Follow up in 6-12 months.

## 2025-06-06 ENCOUNTER — TELEPHONE (OUTPATIENT)
Dept: RHEUMATOLOGY | Facility: CLINIC | Age: 55
End: 2025-06-06

## 2025-06-18 ENCOUNTER — RESULTS FOLLOW-UP (OUTPATIENT)
Dept: NEUROLOGY | Facility: CLINIC | Age: 55
End: 2025-06-18

## 2025-06-18 DIAGNOSIS — G71.01 BECKER'S MUSCULAR DYSTROPHY (HCC): Primary | ICD-10-CM

## 2025-06-18 DIAGNOSIS — I42.9 CARDIOMYOPATHY, UNSPECIFIED TYPE (HCC): ICD-10-CM

## 2025-07-01 ENCOUNTER — TREATMENT (OUTPATIENT)
Dept: ENDOCRINOLOGY | Facility: CLINIC | Age: 55
End: 2025-07-01

## 2025-07-01 DIAGNOSIS — M81.8 OTHER OSTEOPOROSIS WITHOUT CURRENT PATHOLOGICAL FRACTURE: Primary | ICD-10-CM

## 2025-07-08 ENCOUNTER — APPOINTMENT (OUTPATIENT)
Dept: LAB | Age: 55
End: 2025-07-08
Payer: MEDICARE

## 2025-07-08 ENCOUNTER — APPOINTMENT (OUTPATIENT)
Dept: LAB | Age: 55
End: 2025-07-08
Attending: UROLOGY
Payer: MEDICARE

## 2025-07-08 DIAGNOSIS — E55.9 VITAMIN D DEFICIENCY: ICD-10-CM

## 2025-07-08 DIAGNOSIS — E29.1 HYPOGONADISM IN MALE: ICD-10-CM

## 2025-07-08 DIAGNOSIS — N40.0 BENIGN PROSTATIC HYPERPLASIA WITHOUT LOWER URINARY TRACT SYMPTOMS: ICD-10-CM

## 2025-07-08 DIAGNOSIS — M81.8 OTHER OSTEOPOROSIS WITHOUT CURRENT PATHOLOGICAL FRACTURE: ICD-10-CM

## 2025-07-08 LAB
25(OH)D3 SERPL-MCNC: 56.2 NG/ML (ref 30–100)
ALBUMIN SERPL BCG-MCNC: 4.3 G/DL (ref 3.5–5)
ALP SERPL-CCNC: 104 U/L (ref 34–104)
ALT SERPL W P-5'-P-CCNC: 34 U/L (ref 7–52)
ANION GAP SERPL CALCULATED.3IONS-SCNC: 8 MMOL/L (ref 4–13)
AST SERPL W P-5'-P-CCNC: 30 U/L (ref 13–39)
BILIRUB SERPL-MCNC: 0.51 MG/DL (ref 0.2–1)
BUN SERPL-MCNC: 14 MG/DL (ref 5–25)
CALCIUM SERPL-MCNC: 9.3 MG/DL (ref 8.4–10.2)
CHLORIDE SERPL-SCNC: 104 MMOL/L (ref 96–108)
CO2 SERPL-SCNC: 28 MMOL/L (ref 21–32)
CREAT SERPL-MCNC: 0.29 MG/DL (ref 0.6–1.3)
ESTRADIOL SERPL-MCNC: <15 PG/ML (ref 0–33.1)
GFR SERPL CREATININE-BSD FRML MDRD: 152 ML/MIN/1.73SQ M
GLUCOSE P FAST SERPL-MCNC: 92 MG/DL (ref 65–99)
PHOSPHATE SERPL-MCNC: 3.4 MG/DL (ref 2.7–4.5)
POTASSIUM SERPL-SCNC: 3.7 MMOL/L (ref 3.5–5.3)
PROLACTIN SERPL-MCNC: 9.67 NG/ML (ref 2.64–13.13)
PROT SERPL-MCNC: 7.3 G/DL (ref 6.4–8.4)
PSA SERPL-MCNC: 1.97 NG/ML (ref 0–4)
PTH-INTACT SERPL-MCNC: 52.4 PG/ML (ref 12–88)
SODIUM SERPL-SCNC: 140 MMOL/L (ref 135–147)

## 2025-07-08 PROCEDURE — 80053 COMPREHEN METABOLIC PANEL: CPT

## 2025-07-08 PROCEDURE — 84146 ASSAY OF PROLACTIN: CPT

## 2025-07-08 PROCEDURE — 84153 ASSAY OF PSA TOTAL: CPT

## 2025-07-08 PROCEDURE — 82306 VITAMIN D 25 HYDROXY: CPT

## 2025-07-08 PROCEDURE — 36415 COLL VENOUS BLD VENIPUNCTURE: CPT

## 2025-07-08 PROCEDURE — 82670 ASSAY OF TOTAL ESTRADIOL: CPT

## 2025-07-08 PROCEDURE — 84080 ASSAY ALKALINE PHOSPHATASES: CPT

## 2025-07-08 PROCEDURE — 83970 ASSAY OF PARATHORMONE: CPT

## 2025-07-08 PROCEDURE — 82523 COLLAGEN CROSSLINKS: CPT

## 2025-07-08 PROCEDURE — 82570 ASSAY OF URINE CREATININE: CPT

## 2025-07-08 PROCEDURE — 84270 ASSAY OF SEX HORMONE GLOBUL: CPT

## 2025-07-08 PROCEDURE — 84100 ASSAY OF PHOSPHORUS: CPT

## 2025-07-09 LAB — SHBG SERPL-SCNC: 29.3 NMOL/L (ref 19.3–76.4)

## 2025-07-10 LAB — ALP BONE SERPL-MCNC: 14.4 UG/L (ref 7.5–25.7)

## 2025-07-13 LAB — COLLAGEN CTX SERPL-MCNC: 348 PG/ML

## 2025-07-14 LAB
COLLAGEN NTX UR-SCNC: 79 NMOL BCE
COLLAGEN NTX/CREAT UR-SRTO: 56 NM BCE/MM CR (ref 0–62)
CREAT UR-MCNC: 16 MG/DL
INTERPRETIVE GUIDE:: NORMAL

## 2025-07-22 ENCOUNTER — HOSPITAL ENCOUNTER (OUTPATIENT)
Dept: INFUSION CENTER | Facility: CLINIC | Age: 55
Discharge: HOME | End: 2025-07-22
Payer: MEDICARE

## 2025-07-22 VITALS
TEMPERATURE: 98 F | HEART RATE: 97 BPM | SYSTOLIC BLOOD PRESSURE: 131 MMHG | DIASTOLIC BLOOD PRESSURE: 88 MMHG | RESPIRATION RATE: 18 BRPM

## 2025-07-22 DIAGNOSIS — M81.8 OTHER OSTEOPOROSIS WITHOUT CURRENT PATHOLOGICAL FRACTURE: Primary | ICD-10-CM

## 2025-07-22 PROCEDURE — 96366 THER/PROPH/DIAG IV INF ADDON: CPT

## 2025-07-22 PROCEDURE — 96365 THER/PROPH/DIAG IV INF INIT: CPT

## 2025-07-22 RX ORDER — SODIUM CHLORIDE 9 MG/ML
20 INJECTION, SOLUTION INTRAVENOUS ONCE
OUTPATIENT
Start: 2026-07-08

## 2025-07-22 RX ORDER — ZOLEDRONIC ACID 0.05 MG/ML
4 INJECTION, SOLUTION INTRAVENOUS ONCE
Status: COMPLETED | OUTPATIENT
Start: 2025-07-22 | End: 2025-07-22

## 2025-07-22 RX ORDER — SODIUM CHLORIDE 9 MG/ML
20 INJECTION, SOLUTION INTRAVENOUS ONCE
Status: COMPLETED | OUTPATIENT
Start: 2025-07-22 | End: 2025-07-22

## 2025-07-22 RX ORDER — ZOLEDRONIC ACID 0.05 MG/ML
4 INJECTION, SOLUTION INTRAVENOUS ONCE
OUTPATIENT
Start: 2026-07-08

## 2025-07-22 RX ADMIN — ZOLEDRONIC ACID 4 MG: 0.05 INJECTION, SOLUTION INTRAVENOUS at 13:09

## 2025-07-22 RX ADMIN — SODIUM CHLORIDE 20 ML/HR: 0.9 INJECTION, SOLUTION INTRAVENOUS at 13:09

## 2025-07-30 ENCOUNTER — TELEPHONE (OUTPATIENT)
Dept: ADMINISTRATIVE | Facility: OTHER | Age: 55
End: 2025-07-30